# Patient Record
Sex: FEMALE | Race: BLACK OR AFRICAN AMERICAN | NOT HISPANIC OR LATINO | URBAN - METROPOLITAN AREA
[De-identification: names, ages, dates, MRNs, and addresses within clinical notes are randomized per-mention and may not be internally consistent; named-entity substitution may affect disease eponyms.]

---

## 2017-05-08 ENCOUNTER — INPATIENT (INPATIENT)
Facility: HOSPITAL | Age: 57
LOS: 2 days | Discharge: HOME | End: 2017-05-11
Attending: INTERNAL MEDICINE | Admitting: INTERNAL MEDICINE
Payer: MEDICARE

## 2017-05-08 VITALS
TEMPERATURE: 98 F | OXYGEN SATURATION: 100 % | RESPIRATION RATE: 18 BRPM | WEIGHT: 130.07 LBS | SYSTOLIC BLOOD PRESSURE: 142 MMHG | HEART RATE: 72 BPM | DIASTOLIC BLOOD PRESSURE: 77 MMHG | HEIGHT: 64 IN

## 2017-05-08 DIAGNOSIS — K21.9 GASTRO-ESOPHAGEAL REFLUX DISEASE WITHOUT ESOPHAGITIS: ICD-10-CM

## 2017-05-08 DIAGNOSIS — N18.6 END STAGE RENAL DISEASE: ICD-10-CM

## 2017-05-08 DIAGNOSIS — I20.0 UNSTABLE ANGINA: ICD-10-CM

## 2017-05-08 DIAGNOSIS — I10 ESSENTIAL (PRIMARY) HYPERTENSION: ICD-10-CM

## 2017-05-08 DIAGNOSIS — Z90.13 ACQUIRED ABSENCE OF BILATERAL BREASTS AND NIPPLES: Chronic | ICD-10-CM

## 2017-05-08 DIAGNOSIS — D25.9 LEIOMYOMA OF UTERUS, UNSPECIFIED: Chronic | ICD-10-CM

## 2017-05-08 LAB
ALBUMIN SERPL ELPH-MCNC: 2.8 G/DL — LOW (ref 3.3–5)
ALP SERPL-CCNC: 132 U/L — HIGH (ref 40–120)
ALT FLD-CCNC: 8 U/L — LOW (ref 12–78)
ANION GAP SERPL CALC-SCNC: 11 MMOL/L — SIGNIFICANT CHANGE UP (ref 5–17)
APTT BLD: 34.5 SEC — SIGNIFICANT CHANGE UP (ref 27.5–37.4)
AST SERPL-CCNC: 14 U/L — LOW (ref 15–37)
BASOPHILS # BLD AUTO: 0.1 K/UL — SIGNIFICANT CHANGE UP (ref 0–0.2)
BASOPHILS NFR BLD AUTO: 1 % — SIGNIFICANT CHANGE UP (ref 0–2)
BILIRUB SERPL-MCNC: 0.6 MG/DL — SIGNIFICANT CHANGE UP (ref 0.2–1.2)
BUN SERPL-MCNC: 39 MG/DL — HIGH (ref 7–23)
CALCIUM SERPL-MCNC: 7.8 MG/DL — LOW (ref 8.5–10.1)
CHLORIDE SERPL-SCNC: 102 MMOL/L — SIGNIFICANT CHANGE UP (ref 96–108)
CK MB BLD-MCNC: 1.6 % — SIGNIFICANT CHANGE UP (ref 0–3.5)
CK MB CFR SERPL CALC: 1.7 NG/ML — SIGNIFICANT CHANGE UP (ref 0.5–3.6)
CK SERPL-CCNC: 106 U/L — SIGNIFICANT CHANGE UP (ref 26–192)
CO2 SERPL-SCNC: 29 MMOL/L — SIGNIFICANT CHANGE UP (ref 22–31)
CREAT SERPL-MCNC: 6.86 MG/DL — HIGH (ref 0.5–1.3)
D DIMER BLD IA.RAPID-MCNC: 179 NG/ML DDU — SIGNIFICANT CHANGE UP
EOSINOPHIL # BLD AUTO: 0.2 K/UL — SIGNIFICANT CHANGE UP (ref 0–0.5)
EOSINOPHIL NFR BLD AUTO: 1.9 % — SIGNIFICANT CHANGE UP (ref 0–6)
GLUCOSE SERPL-MCNC: 120 MG/DL — HIGH (ref 70–99)
HCT VFR BLD CALC: 31.1 % — LOW (ref 34.5–45)
HGB BLD-MCNC: 11 G/DL — LOW (ref 11.5–15.5)
INR BLD: 1.11 RATIO — SIGNIFICANT CHANGE UP (ref 0.88–1.16)
LYMPHOCYTES # BLD AUTO: 1.8 K/UL — SIGNIFICANT CHANGE UP (ref 1–3.3)
LYMPHOCYTES # BLD AUTO: 21 % — SIGNIFICANT CHANGE UP (ref 13–44)
MCHC RBC-ENTMCNC: 28.1 PG — SIGNIFICANT CHANGE UP (ref 27–34)
MCHC RBC-ENTMCNC: 35.3 GM/DL — SIGNIFICANT CHANGE UP (ref 32–36)
MCV RBC AUTO: 79.6 FL — LOW (ref 80–100)
MONOCYTES # BLD AUTO: 0.5 K/UL — SIGNIFICANT CHANGE UP (ref 0–0.9)
MONOCYTES NFR BLD AUTO: 5.6 % — SIGNIFICANT CHANGE UP (ref 2–14)
NEUTROPHILS # BLD AUTO: 6.1 K/UL — SIGNIFICANT CHANGE UP (ref 1.8–7.4)
NEUTROPHILS NFR BLD AUTO: 70.5 % — SIGNIFICANT CHANGE UP (ref 43–77)
NT-PROBNP SERPL-SCNC: HIGH PG/ML (ref 0–125)
PLATELET # BLD AUTO: 245 K/UL — SIGNIFICANT CHANGE UP (ref 150–400)
POTASSIUM SERPL-MCNC: 4 MMOL/L — SIGNIFICANT CHANGE UP (ref 3.5–5.3)
POTASSIUM SERPL-SCNC: 4 MMOL/L — SIGNIFICANT CHANGE UP (ref 3.5–5.3)
PROT SERPL-MCNC: 6.7 GM/DL — SIGNIFICANT CHANGE UP (ref 6–8.3)
PROTHROM AB SERPL-ACNC: 12.1 SEC — SIGNIFICANT CHANGE UP (ref 9.8–12.7)
RBC # BLD: 3.91 M/UL — SIGNIFICANT CHANGE UP (ref 3.8–5.2)
RBC # FLD: 19.6 % — HIGH (ref 11–15)
SODIUM SERPL-SCNC: 142 MMOL/L — SIGNIFICANT CHANGE UP (ref 135–145)
TROPONIN I SERPL-MCNC: 0.04 NG/ML — SIGNIFICANT CHANGE UP (ref 0.01–0.04)
WBC # BLD: 8.7 K/UL — SIGNIFICANT CHANGE UP (ref 3.8–10.5)
WBC # FLD AUTO: 8.7 K/UL — SIGNIFICANT CHANGE UP (ref 3.8–10.5)

## 2017-05-08 PROCEDURE — 99223 1ST HOSP IP/OBS HIGH 75: CPT

## 2017-05-08 PROCEDURE — 99285 EMERGENCY DEPT VISIT HI MDM: CPT

## 2017-05-08 PROCEDURE — 71010: CPT | Mod: 26

## 2017-05-08 RX ORDER — AMLODIPINE BESYLATE 2.5 MG/1
2.5 TABLET ORAL DAILY
Qty: 0 | Refills: 0 | Status: DISCONTINUED | OUTPATIENT
Start: 2017-05-08 | End: 2017-05-11

## 2017-05-08 RX ORDER — CLOPIDOGREL BISULFATE 75 MG/1
75 TABLET, FILM COATED ORAL DAILY
Qty: 0 | Refills: 0 | Status: DISCONTINUED | OUTPATIENT
Start: 2017-05-08 | End: 2017-05-10

## 2017-05-08 RX ORDER — LABETALOL HCL 100 MG
200 TABLET ORAL EVERY 8 HOURS
Qty: 0 | Refills: 0 | Status: DISCONTINUED | OUTPATIENT
Start: 2017-05-08 | End: 2017-05-11

## 2017-05-08 RX ORDER — PANTOPRAZOLE SODIUM 20 MG/1
40 TABLET, DELAYED RELEASE ORAL
Qty: 0 | Refills: 0 | Status: DISCONTINUED | OUTPATIENT
Start: 2017-05-08 | End: 2017-05-11

## 2017-05-08 RX ORDER — NICOTINE POLACRILEX 2 MG
1 GUM BUCCAL DAILY
Qty: 0 | Refills: 0 | Status: DISCONTINUED | OUTPATIENT
Start: 2017-05-08 | End: 2017-05-11

## 2017-05-08 RX ORDER — HEPARIN SODIUM 5000 [USP'U]/ML
3500 INJECTION INTRAVENOUS; SUBCUTANEOUS EVERY 6 HOURS
Qty: 0 | Refills: 0 | Status: DISCONTINUED | OUTPATIENT
Start: 2017-05-08 | End: 2017-05-09

## 2017-05-08 RX ORDER — LISINOPRIL 2.5 MG/1
40 TABLET ORAL DAILY
Qty: 0 | Refills: 0 | Status: DISCONTINUED | OUTPATIENT
Start: 2017-05-08 | End: 2017-05-11

## 2017-05-08 RX ORDER — ASPIRIN/CALCIUM CARB/MAGNESIUM 324 MG
81 TABLET ORAL DAILY
Qty: 0 | Refills: 0 | Status: DISCONTINUED | OUTPATIENT
Start: 2017-05-09 | End: 2017-05-11

## 2017-05-08 RX ORDER — HEPARIN SODIUM 5000 [USP'U]/ML
3500 INJECTION INTRAVENOUS; SUBCUTANEOUS ONCE
Qty: 0 | Refills: 0 | Status: COMPLETED | OUTPATIENT
Start: 2017-05-08 | End: 2017-05-08

## 2017-05-08 RX ORDER — ASPIRIN/CALCIUM CARB/MAGNESIUM 324 MG
325 TABLET ORAL ONCE
Qty: 0 | Refills: 0 | Status: COMPLETED | OUTPATIENT
Start: 2017-05-08 | End: 2017-05-08

## 2017-05-08 RX ORDER — CALCIUM ACETATE 667 MG
667 TABLET ORAL
Qty: 0 | Refills: 0 | Status: DISCONTINUED | OUTPATIENT
Start: 2017-05-08 | End: 2017-05-11

## 2017-05-08 RX ORDER — HEPARIN SODIUM 5000 [USP'U]/ML
INJECTION INTRAVENOUS; SUBCUTANEOUS
Qty: 25000 | Refills: 0 | Status: DISCONTINUED | OUTPATIENT
Start: 2017-05-08 | End: 2017-05-09

## 2017-05-08 RX ADMIN — Medication 200 MILLIGRAM(S): at 23:15

## 2017-05-08 RX ADMIN — Medication 667 MILLIGRAM(S): at 23:16

## 2017-05-08 RX ADMIN — HEPARIN SODIUM 3500 UNIT(S): 5000 INJECTION INTRAVENOUS; SUBCUTANEOUS at 23:16

## 2017-05-08 RX ADMIN — HEPARIN SODIUM 700 UNIT(S)/HR: 5000 INJECTION INTRAVENOUS; SUBCUTANEOUS at 23:16

## 2017-05-08 RX ADMIN — Medication 325 MILLIGRAM(S): at 20:07

## 2017-05-08 NOTE — H&P ADULT. - MUSCULOSKELETAL
details… detailed exam normal strength/no joint swelling/ROM intact/no joint warmth/no calf tenderness/no joint erythema

## 2017-05-08 NOTE — ED ADULT NURSE NOTE - OBJECTIVE STATEMENT
pt c/o right chest pressure and weakness started yesterday after shopping, pain radiating from right shoulder to neck. + dizziness, denies sob. pt on HD m-w-f. pt did not complete dialysis today. pt states needed 1 hour and 45 to go.

## 2017-05-08 NOTE — H&P ADULT. - FAMILY HISTORY
Father  Still living? No  Family history of essential hypertension, Age at diagnosis: Age Unknown  Family history of diabetes mellitus (DM), Age at diagnosis: Age Unknown  Family history of acute myocardial infarction, Age at diagnosis: Age Unknown     Mother  Still living? No  Family history of essential hypertension, Age at diagnosis: Age Unknown  Family history of diabetes mellitus (DM), Age at diagnosis: Age Unknown

## 2017-05-08 NOTE — H&P ADULT. - PROBLEM SELECTOR PLAN 2
admit telemetry, ASA, IV heparin, beta blocker, nitrates, echo  cardiology consult  possible stress test if stable

## 2017-05-08 NOTE — ED PROVIDER NOTE - OBJECTIVE STATEMENT
55 yo female presents with hypertension presents with right sided chest pain while receiving dialysis. Patient states that chest pain last 5 minutes. Patient denies shortness of breath, fever, chills. Patient denies chest pain at present.

## 2017-05-08 NOTE — H&P ADULT. - HISTORY OF PRESENT ILLNESS
55 y/o female PMH hypertension, ESRD, on dialysis MWF presents for 2 episodes right sided chest pain; first on Sunday, pressure like, moderate intensity, radiating to right arm, associated with SOB, lasting about 30 min, subsiding with rest. Had second episode while receiving dialysis, subsided when dialysis prematurely ended, same characteristics as previous episode. Patient states that chest pain last 5 minutes. At present, patient denies shortness of breath, fever, chills. Patient denies chest pain at present. No previous episodes of chest pain.  Pt is current smoker, denies diabetes or elevated cholesterol.

## 2017-05-08 NOTE — H&P ADULT. - GASTROINTESTINAL DETAILS
nontender/no distention/soft/no masses palpable/no guarding/no rebound tenderness/bowel sounds normal

## 2017-05-08 NOTE — H&P ADULT. - NEGATIVE NEUROLOGICAL SYMPTOMS
no confusion/no facial palsy/no syncope/no paresthesias/no loss of consciousness/no tremors/no headache/no transient paralysis/no weakness/no generalized seizures/no loss of sensation

## 2017-05-08 NOTE — ED ADULT NURSE REASSESSMENT NOTE - NS ED NURSE REASSESS COMMENT FT1
Introduced myself to the pt and acclimated the patient to the unit and the proposed plan of care. Provided the pt with interventions to address comfort concerns and had the pt moved to a bed to be monitored. Pt moved from D to bed 8 with the pt consent and agreement. Provided the pt with reacclimation to the room after moving. Provided the pt with call button and television remote. Pt resting comfortably in the stretcher with belongings within pts reach. Pt has no c/o pain or discomfort at this time and has no s/s of distress. Pt states that her chest pain has resolved and the pain that started yesterday is no longer bothering her. Pt is resting comfortably in the stretcher and watching television.

## 2017-05-08 NOTE — H&P ADULT. - RS GEN PE MLT RESP DETAILS PC
breath sounds equal/no rhonchi/good air movement/clear to auscultation bilaterally/no rales/no wheezes/airway patent/respirations non-labored

## 2017-05-08 NOTE — ED ADULT TRIAGE NOTE - CHIEF COMPLAINT QUOTE
Chest pain substernal during dialysis treatment, 1.5 hours of treatment given then stopped, CP remains, ASA 81mg given by dialysis treatment

## 2017-05-08 NOTE — H&P ADULT. - ASSESSMENT
58 y/o hypertensive woman with ESRD presenting with new onset chest pain; must R/O ACS as has multiple risk factors. Pt pain free at present, received aspirin, will start IV heparin, admit telemetry,  serial cardiac enzymes.

## 2017-05-08 NOTE — ED PROVIDER NOTE - CARE PLAN
Principal Discharge DX:	Chest pain  Secondary Diagnosis:	ESRD on dialysis Principal Discharge DX:	Chest pain  Secondary Diagnosis:	ESRD on dialysis  Secondary Diagnosis:	Abnormal EKG

## 2017-05-09 LAB
ANION GAP SERPL CALC-SCNC: 13 MMOL/L — SIGNIFICANT CHANGE UP (ref 5–17)
APTT BLD: 45.3 SEC — HIGH (ref 27.5–37.4)
APTT BLD: 45.8 SEC — HIGH (ref 27.5–37.4)
APTT BLD: 48.8 SEC — HIGH (ref 27.5–37.4)
BASOPHILS # BLD AUTO: 0.1 K/UL — SIGNIFICANT CHANGE UP (ref 0–0.2)
BASOPHILS NFR BLD AUTO: 1 % — SIGNIFICANT CHANGE UP (ref 0–2)
BUN SERPL-MCNC: 50 MG/DL — HIGH (ref 7–23)
CALCIUM SERPL-MCNC: 8.2 MG/DL — LOW (ref 8.5–10.1)
CHLORIDE SERPL-SCNC: 104 MMOL/L — SIGNIFICANT CHANGE UP (ref 96–108)
CO2 SERPL-SCNC: 27 MMOL/L — SIGNIFICANT CHANGE UP (ref 22–31)
CREAT SERPL-MCNC: 8.14 MG/DL — HIGH (ref 0.5–1.3)
EOSINOPHIL # BLD AUTO: 0.3 K/UL — SIGNIFICANT CHANGE UP (ref 0–0.5)
EOSINOPHIL NFR BLD AUTO: 3.2 % — SIGNIFICANT CHANGE UP (ref 0–6)
GLUCOSE SERPL-MCNC: 101 MG/DL — HIGH (ref 70–99)
HCT VFR BLD CALC: 30.8 % — LOW (ref 34.5–45)
HGB BLD-MCNC: 10.4 G/DL — LOW (ref 11.5–15.5)
LYMPHOCYTES # BLD AUTO: 1.3 K/UL — SIGNIFICANT CHANGE UP (ref 1–3.3)
LYMPHOCYTES # BLD AUTO: 16.1 % — SIGNIFICANT CHANGE UP (ref 13–44)
MCHC RBC-ENTMCNC: 27.6 PG — SIGNIFICANT CHANGE UP (ref 27–34)
MCHC RBC-ENTMCNC: 33.9 GM/DL — SIGNIFICANT CHANGE UP (ref 32–36)
MCV RBC AUTO: 81.2 FL — SIGNIFICANT CHANGE UP (ref 80–100)
MONOCYTES # BLD AUTO: 0.6 K/UL — SIGNIFICANT CHANGE UP (ref 0–0.9)
MONOCYTES NFR BLD AUTO: 7.1 % — SIGNIFICANT CHANGE UP (ref 2–14)
NEUTROPHILS # BLD AUTO: 6 K/UL — SIGNIFICANT CHANGE UP (ref 1.8–7.4)
NEUTROPHILS NFR BLD AUTO: 72.6 % — SIGNIFICANT CHANGE UP (ref 43–77)
PLATELET # BLD AUTO: 239 K/UL — SIGNIFICANT CHANGE UP (ref 150–400)
POTASSIUM SERPL-MCNC: 4.8 MMOL/L — SIGNIFICANT CHANGE UP (ref 3.5–5.3)
POTASSIUM SERPL-SCNC: 4.8 MMOL/L — SIGNIFICANT CHANGE UP (ref 3.5–5.3)
RBC # BLD: 3.79 M/UL — LOW (ref 3.8–5.2)
RBC # FLD: 20.2 % — HIGH (ref 11–15)
SODIUM SERPL-SCNC: 144 MMOL/L — SIGNIFICANT CHANGE UP (ref 135–145)
TROPONIN I SERPL-MCNC: 0.04 NG/ML — SIGNIFICANT CHANGE UP (ref 0.01–0.04)
WBC # BLD: 8.3 K/UL — SIGNIFICANT CHANGE UP (ref 3.8–10.5)
WBC # FLD AUTO: 8.3 K/UL — SIGNIFICANT CHANGE UP (ref 3.8–10.5)

## 2017-05-09 PROCEDURE — 99222 1ST HOSP IP/OBS MODERATE 55: CPT

## 2017-05-09 PROCEDURE — 99233 SBSQ HOSP IP/OBS HIGH 50: CPT

## 2017-05-09 RX ORDER — HEPARIN SODIUM 5000 [USP'U]/ML
INJECTION INTRAVENOUS; SUBCUTANEOUS
Qty: 25000 | Refills: 0 | Status: DISCONTINUED | OUTPATIENT
Start: 2017-05-09 | End: 2017-05-09

## 2017-05-09 RX ORDER — HEPARIN SODIUM 5000 [USP'U]/ML
1000 INJECTION INTRAVENOUS; SUBCUTANEOUS
Qty: 25000 | Refills: 0 | Status: DISCONTINUED | OUTPATIENT
Start: 2017-05-09 | End: 2017-05-10

## 2017-05-09 RX ORDER — HEPARIN SODIUM 5000 [USP'U]/ML
3800 INJECTION INTRAVENOUS; SUBCUTANEOUS EVERY 6 HOURS
Qty: 0 | Refills: 0 | Status: DISCONTINUED | OUTPATIENT
Start: 2017-05-09 | End: 2017-05-10

## 2017-05-09 RX ORDER — ALLOPURINOL 300 MG
100 TABLET ORAL DAILY
Qty: 0 | Refills: 0 | Status: DISCONTINUED | OUTPATIENT
Start: 2017-05-09 | End: 2017-05-09

## 2017-05-09 RX ADMIN — HEPARIN SODIUM 1000 UNIT(S)/HR: 5000 INJECTION INTRAVENOUS; SUBCUTANEOUS at 21:23

## 2017-05-09 RX ADMIN — Medication 667 MILLIGRAM(S): at 17:30

## 2017-05-09 RX ADMIN — HEPARIN SODIUM 900 UNIT(S)/HR: 5000 INJECTION INTRAVENOUS; SUBCUTANEOUS at 13:24

## 2017-05-09 RX ADMIN — Medication 667 MILLIGRAM(S): at 11:49

## 2017-05-09 RX ADMIN — CLOPIDOGREL BISULFATE 75 MILLIGRAM(S): 75 TABLET, FILM COATED ORAL at 11:49

## 2017-05-09 RX ADMIN — Medication 200 MILLIGRAM(S): at 09:29

## 2017-05-09 RX ADMIN — Medication 1 PATCH: at 11:49

## 2017-05-09 RX ADMIN — Medication 200 MILLIGRAM(S): at 21:24

## 2017-05-09 RX ADMIN — Medication 81 MILLIGRAM(S): at 11:49

## 2017-05-09 RX ADMIN — Medication 667 MILLIGRAM(S): at 09:28

## 2017-05-09 RX ADMIN — HEPARIN SODIUM 800 UNIT(S)/HR: 5000 INJECTION INTRAVENOUS; SUBCUTANEOUS at 06:25

## 2017-05-09 RX ADMIN — PANTOPRAZOLE SODIUM 40 MILLIGRAM(S): 20 TABLET, DELAYED RELEASE ORAL at 09:28

## 2017-05-09 RX ADMIN — LISINOPRIL 40 MILLIGRAM(S): 2.5 TABLET ORAL at 09:28

## 2017-05-09 RX ADMIN — AMLODIPINE BESYLATE 2.5 MILLIGRAM(S): 2.5 TABLET ORAL at 09:29

## 2017-05-09 RX ADMIN — Medication 200 MILLIGRAM(S): at 13:24

## 2017-05-09 NOTE — CONSULT NOTE ADULT - SUBJECTIVE AND OBJECTIVE BOX
Chief Complaint:  Patient is a 56y old  Female who presents with a chief complaint of Intermittent chest pain for 2 days. (08 May 2017 21:46)      HPI:  57 y/o female PMH hypertension, ESRD, on dialysis MWF presents for 2 episodes right sided chest pain; first on , pressure like, moderate intensity, radiating to right arm, associated with SOB, lasting about 30 min, subsiding with rest. Had second episode while receiving dialysis, subsided when dialysis prematurely ended, same characteristics as previous episode. Patient states that chest pain last 5 minutes. At present, patient denies shortness of breath, fever, chills. Patient denies chest pain at present. No previous episodes of chest pain.  Pt is current smoker, denies diabetes or elevated cholesterol. (08 May 2017 21:46)    Allergies:        Allergies:  	No Known Allergies:     Home Medications:   · 	amLODIPine 10 mg oral tablet: Last Dose Taken:  , 1 tab(s) orally once a day  · 	labetalol: Last Dose Taken:  , 600 milligram(s) orally every 8 hours  · 	lisinopril 40 mg oral tablet: Last Dose Taken:  , 1 tab(s) orally once a day  · 	calcium acetate 667 mg oral capsule: Last Dose Taken:  , 4 cap(s) orally 3 times a day  · 	omeprazole 20 mg oral delayed release capsule: Last Dose Taken:  , 1 cap(s) orally once a day  · 	PhosLo: Last Dose Taken:  ,  orally       Past Medical History:  ESRD on dialysis    GERD (gastroesophageal reflux disease)    HTN (hypertension).    Past Surgical History:  Fibroid, uterine    History of bilateral breast reduction surgery.    Family History:  Father  Still living? No  Family history of essential hypertension, Age at diagnosis: Age Unknown  Family history of diabetes mellitus (DM), Age at diagnosis: Age Unknown  Family history of acute myocardial infarction, Age at diagnosis: Age Unknown     Mother  Still living? No  Family history of essential hypertension, Age at diagnosis: Age Unknown  Family history of diabetes mellitus (DM), Age at diagnosis: Age Unknown.    Social History:  · Marital Status	Single	  · Lives With	other relative	  · Notes	sister	    Substance Use History:  · Substance Use	never used 	    Alcohol Use History:  · Have you ever consumed alcohol	never	    Tobacco Usage:  · Tobacco Usage: Current every day smoker	  · Tobacco Type: cigarettes 	  · Number of Packs per Day: 0.06 	  · Number of yrs: 15 	  · Pack yrs: 1 	  · Readiness to Quit Tobacco Use: thinking about quitting 	  · Attempts to Quit Tobacco Use: quit on own 	  · Tobacco Withdrawal Patterns: depression  headache 	  · Cessation Medication Offered: accepted	  · Cessation Medication Accepted: order requested	    Review of Systems:  General:  No wt loss, fevers, chills, night sweats  Eyes:  Good vision, no reported pain  ENT:  No sore throat, pain, runny nose, dysphagia  CV:  No pain, palpitations hypo/hypertension  Resp:  No dyspnea, cough, tachypnea, wheezing  GI:  No pain, nausea, vomiting, diarrhea, constipation  :  No pain, bleeding, incontinence, nocturia  Muscle:  No pain, weakness  Breast:  No pain, abscess, mass, discharge  Neuro:  No weakness, tingling, memory problems  Psych:  No fatigue, insomnia, mood problems, depression  Endocrine:  No polyuria, polydypsia, cold/heat intolerance  Heme:  No petechiae, ecchymosis, easy bruisability  Skin:  No rash, edema      Physical Exam:  Vital Signs:  Vital Signs Last 24 Hrs  T(C): 37.1, Max: 37.1 ( @ 12:13)  T(F): 98.8, Max: 98.8 ( @ 12:13)  HR: 69 (63 - 72)  BP: 172/81 (142/77 - 172/81)  RR: 18 (16 - 19)  SpO2: 100% (96% - 100%)  Daily Height in cm: 162.56 (09 May 2017 10:11)    Daily Weight in k (09 May 2017 10:11)  I&O's Summary    I & Os for current day (as of 09 May 2017 16:42)  =============================================  IN: 300 ml / OUT: 0 ml / NET: 300 ml      General:  Appears stated age, well-groomed, well-nourished, no distress  HEENT:  NC/AT, patent nares w/ pink mucosa, OP clear w/o lesions, PERRL, EOMI, conjunctivae clear, no thyromegaly, nodules, adenopathy, no JVD  Chest:  Full & symmetric excursion, no increased effort, breath sounds clear  Cardiovascular:  Regular rhythm, S1, S2, no murmur/rub/S3/S4, no carotid/femoral/abdominal bruit, radial/pedal pulses 2+  Abdomen:  Soft, non-tender, non-distended, normoactive bowel sounds  Extremities: , no edema  Skin:  No rash/erythema/ecchymoses. Skin is warm/dry  Musculoskeletal:  Full ROM in all joints w/o swelling/tenderness/effusion  Neuro/Psych:  Alert, oriented,    Laboratory:                            10.4   8.3   )-----------( 239      ( 09 May 2017 05:48 )             30.8         144  |  104  |  50<H>  ----------------------------<  101<H>  4.8   |  27  |  8.14<H>    Ca    8.2<L>      09 May 2017 05:48    TPro  6.7  /  Alb  2.8<L>  /  TBili  0.6  /  DBili  x   /  AST  14<L>  /  ALT  8<L>  /  AlkPhos  132<H>        CARDIAC MARKERS ( 09 May 2017 15:15 )  .036 ng/mL / x     / x     / x     / x      CARDIAC MARKERS ( 09 May 2017 05:48 )  .035 ng/mL / x     / x     / x     / x      CARDIAC MARKERS ( 08 May 2017 23:42 )  .040 ng/mL / x     / x     / x     / x      CARDIAC MARKERS ( 08 May 2017 18:33 )  .041 ng/mL / x     / 106 U/L / x     / 1.7 ng/mL      LIVER FUNCTIONS - ( 08 May 2017 18:33 )  Alb: 2.8 g/dL / Pro: 6.7 gm/dL / ALK PHOS: 132 U/L / ALT: 8 U/L / AST: 14 U/L / GGT: x           PT/INR - ( 08 May 2017 18:33 )   PT: 12.1 sec;   INR: 1.11 ratio         PTT - ( 09 May 2017 12:52 )  PTT:45.8 sec      Imaging:  ecg:    EXAM:  CHEST SINGLE VIEW                            PROCEDURE DATE:  2017        INTERPRETATION:  CLINICAL INFORMATION:Chest pain    TECHNIQUE: AP chest film. Comparison is made to 2016    FINDINGS AND   IMPRESSION:  Both costophrenic angle is excluded from the study and there   is mild elevation of the right hemidiaphragm.  No focal consolidation or   pleural effusion identified. Heart size appears enlarged, likely   exaggerated by projection. The osseous structures are intact.    Assessment:57 y/o female PMH hypertension, ESRD, on dialysis MWF presents for 2 episodes right sided chest pain; first on , pressure like, moderate intensity, radiating to right arm, associated with SOB, lasting about 30 min, subsiding with rest. Had second episode while receiving dialysis, subsided when dialysis prematurely ended, same characteristics as previous episode. Patient states that chest pain last 5 minutes. At present, patient denies shortness of breath, fever, chills. Patient denies chest pain at present. No previous episodes of chest pain.  Pt is current smoker, denies diabetes or elevated cholesterol.     Plan:  Tele monitor.  Check  echo  Con't with:  lisinopril 40milliGRAM(s) Oral daily  labetalol 200milliGRAM(s) Oral every 8 hours  amLODIPine   Tablet 2.5milliGRAM(s) Oral daily  calcium acetate 667milliGRAM(s) Oral four times a day with meals  pantoprazole    Tablet 40milliGRAM(s) Oral before breakfast  nicotine -   7 mG/24Hr(s) Patch 1patch Transdermal daily  heparin  Infusion. Unit(s)/Hr IV Continuous <Continuous>  clopidogrel Tablet 75milliGRAM(s) Oral daily  aspirin enteric coated 81milliGRAM(s) Oral daily     smoking cessation and supportive care.    Nimesh Manley MD, FACC, LENNOX, MARINA, FACP  Director, Heart Failure Services  [facility]  , Department of Cardiology  [facility] Chief Complaint:  Patient is a 56y old  Female who presents with a chief complaint of Intermittent chest pain for 2 days. (08 May 2017 21:46)      HPI:  55 y/o female PMH hypertension, ESRD, on dialysis MWF presents for 2 episodes right sided chest pain; first on , pressure like, moderate intensity, radiating to right arm, associated with SOB, lasting about 30 min, subsiding with rest. Had second episode while receiving dialysis, subsided when dialysis prematurely ended, same characteristics as previous episode. Patient states that chest pain last 5 minutes. At present, patient denies shortness of breath, fever, chills. Patient denies chest pain at present. No previous episodes of chest pain.  Pt is current smoker, denies diabetes or elevated cholesterol. (08 May 2017 21:46)    Allergies:        Allergies:  	No Known Allergies:     Home Medications:   · 	amLODIPine 10 mg oral tablet: Last Dose Taken:  , 1 tab(s) orally once a day  · 	labetalol: Last Dose Taken:  , 600 milligram(s) orally every 8 hours  · 	lisinopril 40 mg oral tablet: Last Dose Taken:  , 1 tab(s) orally once a day  · 	calcium acetate 667 mg oral capsule: Last Dose Taken:  , 4 cap(s) orally 3 times a day  · 	omeprazole 20 mg oral delayed release capsule: Last Dose Taken:  , 1 cap(s) orally once a day  · 	PhosLo: Last Dose Taken:  ,  orally       Past Medical History:  ESRD on dialysis    GERD (gastroesophageal reflux disease)    HTN (hypertension).    Past Surgical History:  Fibroid, uterine    History of bilateral breast reduction surgery.    Family History:  Father  Still living? No  Family history of essential hypertension, Age at diagnosis: Age Unknown  Family history of diabetes mellitus (DM), Age at diagnosis: Age Unknown  Family history of acute myocardial infarction, Age at diagnosis: Age Unknown     Mother  Still living? No  Family history of essential hypertension, Age at diagnosis: Age Unknown  Family history of diabetes mellitus (DM), Age at diagnosis: Age Unknown.    Social History:  · Marital Status	Single	  · Lives With	other relative	  · Notes	sister	    Substance Use History:  · Substance Use	never used 	    Alcohol Use History:  · Have you ever consumed alcohol	never	    Tobacco Usage:  · Tobacco Usage: Current every day smoker	  · Tobacco Type: cigarettes 	  · Number of Packs per Day: 0.06 	  · Number of yrs: 15 	  · Pack yrs: 1 	  · Readiness to Quit Tobacco Use: thinking about quitting 	  · Attempts to Quit Tobacco Use: quit on own 	  · Tobacco Withdrawal Patterns: depression  headache 	  · Cessation Medication Offered: accepted	  · Cessation Medication Accepted: order requested	    Review of Systems:  General:  No wt loss, fevers, chills, night sweats  Eyes:  Good vision, no reported pain  ENT:  No sore throat, pain, runny nose, dysphagia  CV:  No pain, palpitations hypo/hypertension  Resp:  No dyspnea, cough, tachypnea, wheezing  GI:  No pain, nausea, vomiting, diarrhea, constipation  :  No pain, bleeding, incontinence, nocturia  Muscle:  No pain, weakness  Breast:  No pain, abscess, mass, discharge  Neuro:  No weakness, tingling, memory problems  Psych:  No fatigue, insomnia, mood problems, depression  Endocrine:  No polyuria, polydypsia, cold/heat intolerance  Heme:  No petechiae, ecchymosis, easy bruisability  Skin:  No rash, edema      Physical Exam:  Vital Signs:  Vital Signs Last 24 Hrs  T(C): 37.1, Max: 37.1 ( @ 12:13)  T(F): 98.8, Max: 98.8 ( @ 12:13)  HR: 69 (63 - 72)  BP: 172/81 (142/77 - 172/81)  RR: 18 (16 - 19)  SpO2: 100% (96% - 100%)  Daily Height in cm: 162.56 (09 May 2017 10:11)    Daily Weight in k (09 May 2017 10:11)  I&O's Summary    I & Os for current day (as of 09 May 2017 16:42)  =============================================  IN: 300 ml / OUT: 0 ml / NET: 300 ml    Tele: SR  General:  Appears stated age, well-groomed, well-nourished, no distress  HEENT:  NC/AT, patent nares w/ pink mucosa, OP clear w/o lesions, PERRL, EOMI, conjunctivae clear, no thyromegaly, nodules, adenopathy, no JVD  Chest:  Full & symmetric excursion, no increased effort, breath sounds clear  Cardiovascular:  Regular rhythm, S1, S2, no murmur/rub/S3/S4, no carotid/femoral/abdominal bruit, radial/pedal pulses 2+  Abdomen:  Soft, non-tender, non-distended, normoactive bowel sounds  Extremities: , no edema  Skin:  No rash/erythema/ecchymoses. Skin is warm/dry  Musculoskeletal:  Full ROM in all joints w/o swelling/tenderness/effusion  Neuro/Psych:  Alert, oriented,    Laboratory:                            10.4   8.3   )-----------( 239      ( 09 May 2017 05:48 )             30.8         144  |  104  |  50<H>  ----------------------------<  101<H>  4.8   |  27  |  8.14<H>    Ca    8.2<L>      09 May 2017 05:48    TPro  6.7  /  Alb  2.8<L>  /  TBili  0.6  /  DBili  x   /  AST  14<L>  /  ALT  8<L>  /  AlkPhos  132<H>  08      CARDIAC MARKERS ( 09 May 2017 15:15 )  .036 ng/mL / x     / x     / x     / x      CARDIAC MARKERS ( 09 May 2017 05:48 )  .035 ng/mL / x     / x     / x     / x      CARDIAC MARKERS ( 08 May 2017 23:42 )  .040 ng/mL / x     / x     / x     / x      CARDIAC MARKERS ( 08 May 2017 18:33 )  .041 ng/mL / x     / 106 U/L / x     / 1.7 ng/mL      LIVER FUNCTIONS - ( 08 May 2017 18:33 )  Alb: 2.8 g/dL / Pro: 6.7 gm/dL / ALK PHOS: 132 U/L / ALT: 8 U/L / AST: 14 U/L / GGT: x           PT/INR - ( 08 May 2017 18:33 )   PT: 12.1 sec;   INR: 1.11 ratio         PTT - ( 09 May 2017 12:52 )  PTT:45.8 sec      Imaging:  ecg: SR, LAE, LVH    EXAM:  CHEST SINGLE VIEW                            PROCEDURE DATE:  2017        INTERPRETATION:  CLINICAL INFORMATION:Chest pain    TECHNIQUE: AP chest film. Comparison is made to 2016    FINDINGS AND   IMPRESSION:  Both costophrenic angle is excluded from the study and there   is mild elevation of the right hemidiaphragm.  No focal consolidation or   pleural effusion identified. Heart size appears enlarged, likely   exaggerated by projection. The osseous structures are intact.    Assessment:55 y/o female PMH hypertension, ESRD, on dialysis MWF presents for 2 episodes right sided chest pain; first on , pressure like, moderate intensity, radiating to right arm, associated with SOB, lasting about 30 min, subsiding with rest. Had second episode while receiving dialysis, subsided when dialysis prematurely ended, same characteristics as previous episode. Patient states that chest pain last 5 minutes. At present, patient denies shortness of breath, fever, chills. Patient denies chest pain at present. No previous episodes of chest pain.  Pt is current smoker, denies diabetes or elevated cholesterol. Resting. NAD.    Plan:  Tele monitor.  Check  echo  Con't with:  lisinopril 40milliGRAM(s) Oral daily  labetalol 200milliGRAM(s) Oral every 8 hours  amLODIPine   Tablet 2.5milliGRAM(s) Oral daily  calcium acetate 667milliGRAM(s) Oral four times a day with meals  pantoprazole    Tablet 40milliGRAM(s) Oral before breakfast  nicotine -   7 mG/24Hr(s) Patch 1patch Transdermal daily  heparin  Infusion. Unit(s)/Hr IV Continuous <Continuous>  clopidogrel Tablet 75milliGRAM(s) Oral daily  aspirin enteric coated 81milliGRAM(s) Oral daily     smoking cessation and supportive care.    Nimesh Manley MD, FACC, FASE, FASNC, FACP  Director, Heart Failure Services  [facility]  , Department of Cardiology  [facility]

## 2017-05-09 NOTE — CONSULT NOTE ADULT - SUBJECTIVE AND OBJECTIVE BOX
Patient is a 56y old  Female who presents with a chief complaint of Intermittent chest pain for 2 days. (08 May 2017 21:46)    HPI:  55 y/o female PMH hypertension, ESRD, on dialysis MWF presents for 2 episodes right sided chest pain; first on Sunday, pressure like, moderate intensity, radiating to right arm, associated with SOB, lasting about 30 min, subsiding with rest. Had second episode while receiving dialysis, subsided when dialysis prematurely ended, same characteristics as previous episode. Patient states that chest pain last 5 minutes. At present, patient denies shortness of breath, fever, chills. Patient denies chest pain at present. No previous episodes of chest pain.  Pt is current smoker, denies diabetes or elevated cholesterol. (08 May 2017 21:46)    Patient HD terminated in the last 1 hour yesterday.  NO sob or chest pain now.    PAST MEDICAL & SURGICAL HISTORY:  GERD (gastroesophageal reflux disease)  ESRD on dialysis  HTN (hypertension)  Fibroid, uterine  History of bilateral breast reduction surgery    FAMILY HISTORY:  Family history of acute myocardial infarction (Father)  Family history of diabetes mellitus (DM) (Father, Mother)  Family history of essential hypertension (Father, Mother)  No pertinent family history in first degree relatives    No Known Allergies    MEDICATIONS  (STANDING):  lisinopril 40milliGRAM(s) Oral daily  labetalol 200milliGRAM(s) Oral every 8 hours  amLODIPine   Tablet 2.5milliGRAM(s) Oral daily  calcium acetate 667milliGRAM(s) Oral four times a day with meals  pantoprazole    Tablet 40milliGRAM(s) Oral before breakfast  nicotine -   7 mG/24Hr(s) Patch 1patch Transdermal daily  heparin  Infusion. Unit(s)/Hr IV Continuous <Continuous>  clopidogrel Tablet 75milliGRAM(s) Oral daily  aspirin enteric coated 81milliGRAM(s) Oral daily    MEDICATIONS  (PRN):  heparin  Injectable 3500Unit(s) IV Push every 6 hours PRN For aPTT less than 40    Vital Signs Last 24 Hrs  T(C): 37.1, Max: 37.1 (05-09 @ 12:13)  T(F): 98.8, Max: 98.8 (05-09 @ 12:13)  HR: 69 (63 - 72)  BP: 172/81 (142/77 - 172/81)  BP(mean): --  RR: 18 (16 - 19)  SpO2: 100% (96% - 100%)    CAPILLARY BLOOD GLUCOSE    PHYSICAL EXAM:      T(C): 37.1, Max: 37.1 (05-09 @ 12:13)  HR: 69 (63 - 72)  BP: 172/81 (142/77 - 172/81)  RR: 18 (16 - 19)  SpO2: 100% (96% - 100%)  Wt(kg): --  Respiratory: clear anteriorly, decreased BS at bases  Cardiovascular: S1 S2  Gastrointestinal: soft NT ND +BS  Extremities:    tr edema  + AVF              05-09    144  |  104  |  50<H>  ----------------------------<  101<H>  4.8   |  27  |  8.14<H>    Ca    8.2<L>      09 May 2017 05:48    TPro  6.7  /  Alb  2.8<L>  /  TBili  0.6  /  DBili  x   /  AST  14<L>  /  ALT  8<L>  /  AlkPhos  132<H>  05-08                          10.4   8.3   )-----------( 239      ( 09 May 2017 05:48 )             30.8         Assessment and Plan.    ESRD, chest pain , r/o ACS;   HD for Wednesday;   Will follow.

## 2017-05-10 DIAGNOSIS — N18.6 END STAGE RENAL DISEASE: ICD-10-CM

## 2017-05-10 LAB
ANION GAP SERPL CALC-SCNC: 14 MMOL/L — SIGNIFICANT CHANGE UP (ref 5–17)
APTT BLD: 53.1 SEC — HIGH (ref 27.5–37.4)
APTT BLD: 54.2 SEC — HIGH (ref 27.5–37.4)
BUN SERPL-MCNC: 61 MG/DL — HIGH (ref 7–23)
CALCIUM SERPL-MCNC: 8.2 MG/DL — LOW (ref 8.5–10.1)
CHLORIDE SERPL-SCNC: 101 MMOL/L — SIGNIFICANT CHANGE UP (ref 96–108)
CO2 SERPL-SCNC: 26 MMOL/L — SIGNIFICANT CHANGE UP (ref 22–31)
CREAT SERPL-MCNC: 9.35 MG/DL — HIGH (ref 0.5–1.3)
GLUCOSE SERPL-MCNC: 96 MG/DL — SIGNIFICANT CHANGE UP (ref 70–99)
HCT VFR BLD CALC: 29.7 % — LOW (ref 34.5–45)
HGB BLD-MCNC: 10.5 G/DL — LOW (ref 11.5–15.5)
MCHC RBC-ENTMCNC: 28.7 PG — SIGNIFICANT CHANGE UP (ref 27–34)
MCHC RBC-ENTMCNC: 35.4 GM/DL — SIGNIFICANT CHANGE UP (ref 32–36)
MCV RBC AUTO: 81 FL — SIGNIFICANT CHANGE UP (ref 80–100)
PLATELET # BLD AUTO: 218 K/UL — SIGNIFICANT CHANGE UP (ref 150–400)
POTASSIUM SERPL-MCNC: 4.5 MMOL/L — SIGNIFICANT CHANGE UP (ref 3.5–5.3)
POTASSIUM SERPL-SCNC: 4.5 MMOL/L — SIGNIFICANT CHANGE UP (ref 3.5–5.3)
RBC # BLD: 3.66 M/UL — LOW (ref 3.8–5.2)
RBC # FLD: 19.9 % — HIGH (ref 11–15)
SODIUM SERPL-SCNC: 141 MMOL/L — SIGNIFICANT CHANGE UP (ref 135–145)
WBC # BLD: 8.5 K/UL — SIGNIFICANT CHANGE UP (ref 3.8–10.5)
WBC # FLD AUTO: 8.5 K/UL — SIGNIFICANT CHANGE UP (ref 3.8–10.5)

## 2017-05-10 PROCEDURE — 99232 SBSQ HOSP IP/OBS MODERATE 35: CPT

## 2017-05-10 PROCEDURE — 99233 SBSQ HOSP IP/OBS HIGH 50: CPT

## 2017-05-10 PROCEDURE — 78452 HT MUSCLE IMAGE SPECT MULT: CPT | Mod: 26

## 2017-05-10 RX ORDER — OXYCODONE HYDROCHLORIDE 5 MG/1
5 TABLET ORAL ONCE
Qty: 0 | Refills: 0 | Status: DISCONTINUED | OUTPATIENT
Start: 2017-05-10 | End: 2017-05-10

## 2017-05-10 RX ORDER — ACETAMINOPHEN 500 MG
650 TABLET ORAL ONCE
Qty: 0 | Refills: 0 | Status: COMPLETED | OUTPATIENT
Start: 2017-05-10 | End: 2017-05-10

## 2017-05-10 RX ORDER — REGADENOSON 0.08 MG/ML
0.4 INJECTION, SOLUTION INTRAVENOUS ONCE
Qty: 0 | Refills: 0 | Status: COMPLETED | OUTPATIENT
Start: 2017-05-10 | End: 2017-05-10

## 2017-05-10 RX ADMIN — Medication 667 MILLIGRAM(S): at 14:29

## 2017-05-10 RX ADMIN — Medication 200 MILLIGRAM(S): at 14:30

## 2017-05-10 RX ADMIN — Medication 667 MILLIGRAM(S): at 16:39

## 2017-05-10 RX ADMIN — Medication 81 MILLIGRAM(S): at 14:28

## 2017-05-10 RX ADMIN — PANTOPRAZOLE SODIUM 40 MILLIGRAM(S): 20 TABLET, DELAYED RELEASE ORAL at 06:21

## 2017-05-10 RX ADMIN — HEPARIN SODIUM 1000 UNIT(S)/HR: 5000 INJECTION INTRAVENOUS; SUBCUTANEOUS at 05:23

## 2017-05-10 RX ADMIN — REGADENOSON 0.4 MILLIGRAM(S): 0.08 INJECTION, SOLUTION INTRAVENOUS at 11:26

## 2017-05-10 RX ADMIN — Medication 1 PATCH: at 14:29

## 2017-05-10 RX ADMIN — Medication 200 MILLIGRAM(S): at 05:21

## 2017-05-10 RX ADMIN — LISINOPRIL 40 MILLIGRAM(S): 2.5 TABLET ORAL at 05:23

## 2017-05-10 RX ADMIN — Medication 200 MILLIGRAM(S): at 21:30

## 2017-05-10 RX ADMIN — CLOPIDOGREL BISULFATE 75 MILLIGRAM(S): 75 TABLET, FILM COATED ORAL at 14:29

## 2017-05-10 RX ADMIN — Medication 667 MILLIGRAM(S): at 08:36

## 2017-05-10 RX ADMIN — OXYCODONE HYDROCHLORIDE 5 MILLIGRAM(S): 5 TABLET ORAL at 03:40

## 2017-05-10 RX ADMIN — Medication 1 PATCH: at 14:19

## 2017-05-10 RX ADMIN — OXYCODONE HYDROCHLORIDE 5 MILLIGRAM(S): 5 TABLET ORAL at 04:39

## 2017-05-10 RX ADMIN — HEPARIN SODIUM 1000 UNIT(S)/HR: 5000 INJECTION INTRAVENOUS; SUBCUTANEOUS at 14:32

## 2017-05-10 RX ADMIN — OXYCODONE HYDROCHLORIDE 5 MILLIGRAM(S): 5 TABLET ORAL at 23:48

## 2017-05-10 RX ADMIN — AMLODIPINE BESYLATE 2.5 MILLIGRAM(S): 2.5 TABLET ORAL at 05:21

## 2017-05-10 RX ADMIN — Medication 650 MILLIGRAM(S): at 23:48

## 2017-05-10 NOTE — PROGRESS NOTE ADULT - PROBLEM SELECTOR PLAN 1
HD as per renal, M/W/F
dialysis not completed,  renal consult appreciated   no SOB at present, no hyperkalemia
ASA, IV heparin, beta blocker, nitrates,   troponin not indicative of ACS.  However with elevated risk for CAD will plan stress test today.

## 2017-05-10 NOTE — PROGRESS NOTE ADULT - PROBLEM SELECTOR PROBLEM 4
Gastroesophageal reflux disease, esophagitis presence not specified
Gastroesophageal reflux disease, esophagitis presence not specified
ESRD on dialysis

## 2017-05-10 NOTE — PROGRESS NOTE ADULT - PROBLEM SELECTOR PROBLEM 3
Essential hypertension
Essential hypertension
Gastroesophageal reflux disease, esophagitis presence not specified

## 2017-05-10 NOTE — PROGRESS NOTE ADULT - ASSESSMENT
55 y/o female PMH hypertension, ESRD, on dialysis MWF presents for 2 episodes right sided chest pain; first on Sunday, pressure like, moderate intensity, radiating to right arm, associated with SOB, lasting about 30 min, subsiding with rest. Had second episode while receiving dialysis, subsided when dialysis prematurely ended, same characteristics as previous episode. Patient states that chest pain last 5 minutes. At present, patient denies shortness of breath, fever, chills. Patient denies chest pain at present. No previous episodes of chest pain.  Pt is current smoker, denies diabetes or elevated cholesterol. On Heparin drip r/t possibility of cardiac origin. Stress test ordered.
58 y/o hypertensive woman with ESRD presenting with new onset chest pain; must R/O ACS as has multiple risk factors. Pt pain free at present, received aspirin, will start IV heparin, admit telemetry,  cardiac enzymes neg., stress test today
58 y/o hypertensive woman with ESRD presenting with new onset chest pain; must R/O ACS as has multiple risk factors. Pt pain free at present, received aspirin, will start IV heparin, admit telemetry,  serial cardiac enzymes.

## 2017-05-10 NOTE — PROGRESS NOTE ADULT - PROBLEM SELECTOR PLAN 2
admit telemetry, ASA, IV heparin, beta blocker, nitrates, echo  cardiology consult appreciated   possible stress test if stable
admit telemetry, ASA, beta blocker, nitrates, echo  stress test negative  heparin discontinued
continue BP medication.

## 2017-05-11 VITALS
DIASTOLIC BLOOD PRESSURE: 82 MMHG | TEMPERATURE: 99 F | SYSTOLIC BLOOD PRESSURE: 169 MMHG | RESPIRATION RATE: 18 BRPM | OXYGEN SATURATION: 100 % | HEART RATE: 66 BPM

## 2017-05-11 LAB
ANION GAP SERPL CALC-SCNC: 11 MMOL/L — SIGNIFICANT CHANGE UP (ref 5–17)
APTT BLD: 33.5 SEC — SIGNIFICANT CHANGE UP (ref 27.5–37.4)
BUN SERPL-MCNC: 31 MG/DL — HIGH (ref 7–23)
CALCIUM SERPL-MCNC: 8.4 MG/DL — LOW (ref 8.5–10.1)
CHLORIDE SERPL-SCNC: 101 MMOL/L — SIGNIFICANT CHANGE UP (ref 96–108)
CO2 SERPL-SCNC: 30 MMOL/L — SIGNIFICANT CHANGE UP (ref 22–31)
CREAT SERPL-MCNC: 6.54 MG/DL — HIGH (ref 0.5–1.3)
GLUCOSE SERPL-MCNC: 121 MG/DL — HIGH (ref 70–99)
HCT VFR BLD CALC: 31 % — LOW (ref 34.5–45)
HGB BLD-MCNC: 10.3 G/DL — LOW (ref 11.5–15.5)
MCHC RBC-ENTMCNC: 27.2 PG — SIGNIFICANT CHANGE UP (ref 27–34)
MCHC RBC-ENTMCNC: 33.2 GM/DL — SIGNIFICANT CHANGE UP (ref 32–36)
MCV RBC AUTO: 82 FL — SIGNIFICANT CHANGE UP (ref 80–100)
PLATELET # BLD AUTO: 220 K/UL — SIGNIFICANT CHANGE UP (ref 150–400)
POTASSIUM SERPL-MCNC: 4.3 MMOL/L — SIGNIFICANT CHANGE UP (ref 3.5–5.3)
POTASSIUM SERPL-SCNC: 4.3 MMOL/L — SIGNIFICANT CHANGE UP (ref 3.5–5.3)
RBC # BLD: 3.78 M/UL — LOW (ref 3.8–5.2)
RBC # FLD: 20 % — HIGH (ref 11–15)
SODIUM SERPL-SCNC: 142 MMOL/L — SIGNIFICANT CHANGE UP (ref 135–145)
WBC # BLD: 6.7 K/UL — SIGNIFICANT CHANGE UP (ref 3.8–10.5)
WBC # FLD AUTO: 6.7 K/UL — SIGNIFICANT CHANGE UP (ref 3.8–10.5)

## 2017-05-11 PROCEDURE — 99239 HOSP IP/OBS DSCHRG MGMT >30: CPT

## 2017-05-11 PROCEDURE — 93306 TTE W/DOPPLER COMPLETE: CPT | Mod: 26

## 2017-05-11 RX ORDER — AMLODIPINE BESYLATE 2.5 MG/1
5 TABLET ORAL ONCE
Qty: 0 | Refills: 0 | Status: COMPLETED | OUTPATIENT
Start: 2017-05-11 | End: 2017-05-11

## 2017-05-11 RX ORDER — NICOTINE POLACRILEX 2 MG
1 GUM BUCCAL
Qty: 1 | Refills: 0
Start: 2017-05-11 | End: 2017-06-10

## 2017-05-11 RX ADMIN — OXYCODONE HYDROCHLORIDE 5 MILLIGRAM(S): 5 TABLET ORAL at 00:29

## 2017-05-11 RX ADMIN — Medication 650 MILLIGRAM(S): at 00:29

## 2017-05-11 RX ADMIN — AMLODIPINE BESYLATE 5 MILLIGRAM(S): 2.5 TABLET ORAL at 15:32

## 2017-05-11 RX ADMIN — Medication 667 MILLIGRAM(S): at 11:52

## 2017-05-11 RX ADMIN — LISINOPRIL 40 MILLIGRAM(S): 2.5 TABLET ORAL at 05:13

## 2017-05-11 RX ADMIN — AMLODIPINE BESYLATE 2.5 MILLIGRAM(S): 2.5 TABLET ORAL at 05:12

## 2017-05-11 RX ADMIN — Medication 1 PATCH: at 11:52

## 2017-05-11 RX ADMIN — Medication 200 MILLIGRAM(S): at 14:38

## 2017-05-11 RX ADMIN — Medication 1 PATCH: at 14:37

## 2017-05-11 RX ADMIN — Medication 81 MILLIGRAM(S): at 11:52

## 2017-05-11 RX ADMIN — PANTOPRAZOLE SODIUM 40 MILLIGRAM(S): 20 TABLET, DELAYED RELEASE ORAL at 05:13

## 2017-05-11 RX ADMIN — Medication 200 MILLIGRAM(S): at 05:12

## 2017-05-11 NOTE — PROGRESS NOTE ADULT - SUBJECTIVE AND OBJECTIVE BOX
Patient is a 56y old  Female who presents with a chief complaint of Intermittent chest pain for 2 days. (08 May 2017 21:46)    HPI :57 y/o female PMH hypertension, ESRD, on dialysis MWF presents for 2 episodes right sided chest pain; first on Sunday, pressure like, moderate intensity, radiating to right arm, associated with SOB, lasting about 30 min, subsiding with rest. Had second episode while receiving dialysis, subsided when dialysis prematurely ended, same characteristics as previous episode. Patient states that chest pain last 5 minutes. At present, patient denies shortness of breath, fever, chills. No previous episodes of chest pain.  Pt is current smoker, denies diabetes or elevated cholesterol    PAST MEDICAL & SURGICAL HISTORY:  GERD (gastroesophageal reflux disease)  ESRD on dialysis  HTN (hypertension)  Fibroid, uterine  History of bilateral breast reduction surgery      INTERVAL HISTORY: No further CP overnight. Heparin drip is therapeutic.   	  MEDICATIONS:  MEDICATIONS  (STANDING):  lisinopril 40milliGRAM(s) Oral daily  labetalol 200milliGRAM(s) Oral every 8 hours  amLODIPine   Tablet 2.5milliGRAM(s) Oral daily  calcium acetate 667milliGRAM(s) Oral four times a day with meals  pantoprazole    Tablet 40milliGRAM(s) Oral before breakfast  nicotine -   7 mG/24Hr(s) Patch 1patch Transdermal daily  clopidogrel Tablet 75milliGRAM(s) Oral daily  aspirin enteric coated 81milliGRAM(s) Oral daily  heparin  Infusion. 1000Unit(s)/Hr IV Continuous <Continuous>  regadenoson Injectable 0.4milliGRAM(s) IV Push once    MEDICATIONS  (PRN):  heparin  Injectable 3800Unit(s) IV Push every 6 hours PRN For aPTT less than 40      Vitals:  T(F): 97.6, Max: 98.8 (05-09 @ 12:13)  HR: 70 (63 - 80)  BP: 172/84 (166/75 - 172/84)  RR: 18 (18 - 18)  SpO2: 97% (97% - 100%)  Wt(kg): --    I & Os for current day (as of 05-10 @ 10:01)  =============================================  IN:    Oral Fluid: 660 ml    Total IN: 660 ml  ---------------------------------------------  OUT:    Total OUT: 0 ml  ---------------------------------------------  Total NET: 660 ml    Height (cm): 162.6 (05-09 @ 10:11)  Weight (kg): 64 (05-09 @ 10:11)  BMI (kg/m2): 24.2 (05-09 @ 10:11)  BSA (m2): 1.69 (05-09 @ 10:11)    PHYSICAL EXAM:  Neuro: Awake, responsive  CV: S1 S2 RRR, NSR on Tele  Lungs: CTABL anteriorly, dim bibas  GI: Soft, BS +, ND, NT  Extremities: tr edema, (+) AVF    TELEMETRY: NSR    ecg: SR, LAE, LVH    EXAM:  CHEST SINGLE VIEW                            PROCEDURE DATE:  05/08/2017        INTERPRETATION:  CLINICAL INFORMATION:Chest pain    TECHNIQUE: AP chest film. Comparison is made to 12/26/2016    FINDINGS AND   IMPRESSION:  Both costophrenic angle is excluded from the study and there   is mild elevation of the right hemidiaphragm.  No focal consolidation or   pleural effusion identified. Heart size appears enlarged, likely   exaggerated by projection. The osseous structures are intact.  	     DIAGNOSTIC TESTING:    [ ] Echocardiogram:    [ ] Stress Test:  to be done today   OTHER: 	    LABS:	 	    CARDIAC MARKERS:  Troponin I, Serum: .036 ng/mL (05-09 @ 15:15)  Troponin I, Serum: .035 ng/mL (05-09 @ 05:48)  Troponin I, Serum: .040 ng/mL (05-08 @ 23:42)  Troponin I, Serum: .041 ng/mL (05-08 @ 18:33)                            10.5   8.5   )-----------( 218      ( 10 May 2017 04:35 )             29.7 ,                       10.4   8.3   )-----------( 239      ( 09 May 2017 05:48 )             30.8 ,                       11.0   8.7   )-----------( 245      ( 08 May 2017 18:33 )             31.1   05-09    144  |  104  |  50<H>  ----------------------------<  101<H>  4.8   |  27  |  8.14<H>    Ca    8.2<L>      09 May 2017 05:48    TPro  6.7  /  Alb  2.8<L>  /  TBili  0.6  /  DBili  x   /  AST  14<L>  /  ALT  8<L>  /  AlkPhos  132<H>  05-08    proBNP: Serum Pro-Brain Natriuretic Peptide: 09421 pg/mL (05-08 @ 19:40)    :
CHIEF COMPLAINT/INTERVAL HISTORY:    Patient is a 56y old  Female who presents with a chief complaint of Intermittent chest pain for 2 days. (08 May 2017 21:46)      HPI:  55 y/o female PMH hypertension, ESRD, on dialysis MWF presents for 2 episodes right sided chest pain; first on Sunday, pressure like, moderate intensity, radiating to right arm, associated with SOB, lasting about 30 min, subsiding with rest. Had second episode while receiving dialysis, subsided when dialysis prematurely ended, same characteristics as previous episode. Patient states that chest pain last 5 minutes. At present, patient denies shortness of breath, fever, chills. Patient denies chest pain at present. No previous episodes of chest pain.  Pt is current smoker, denies diabetes or elevated cholesterol. (08 May 2017 21:46)    Overnight issues            SUBJECTIVE & OBJECTIVE: Pt seen and examined at bedside.   ROS:  CONSTITUTIONAL: No fever, weight loss, or fatigue  NECK: No pain or stiffness  RESPIRATORY: No cough, wheezing, chills or hemoptysis; No shortness of breath  CARDIOVASCULAR: No chest pain, palpitations, dizziness, or leg swelling  GASTROINTESTINAL: No abdominal or epigastric pain. No nausea, vomiting, or hematemesis; No diarrhea or constipation. No melena or hematochezia.  GENITOURINARY: No dysuria, frequency, hematuria, or incontinence  NEUROLOGICAL: No headaches, memory loss, loss of strength, numbness, or tremors  SKIN: No itching, burning, rashes, or lesions   ICU Vital Signs Last 24 Hrs  T(C): 37.1, Max: 37.1 (05-09 @ 12:13)  T(F): 98.8, Max: 98.8 (05-09 @ 12:13)  HR: 67 (63 - 70)  BP: 172/83 (157/79 - 172/83)  BP(mean): --  ABP: --  ABP(mean): --  RR: 18 (16 - 19)  SpO2: 99% (96% - 100%)        MEDICATIONS  (STANDING):  lisinopril 40milliGRAM(s) Oral daily  labetalol 200milliGRAM(s) Oral every 8 hours  amLODIPine   Tablet 2.5milliGRAM(s) Oral daily  calcium acetate 667milliGRAM(s) Oral four times a day with meals  pantoprazole    Tablet 40milliGRAM(s) Oral before breakfast  nicotine -   7 mG/24Hr(s) Patch 1patch Transdermal daily  heparin  Infusion. Unit(s)/Hr IV Continuous <Continuous>  clopidogrel Tablet 75milliGRAM(s) Oral daily  aspirin enteric coated 81milliGRAM(s) Oral daily    MEDICATIONS  (PRN):  heparin  Injectable 3500Unit(s) IV Push every 6 hours PRN For aPTT less than 40        PHYSICAL EXAM:    GENERAL: NAD, well-groomed, well-developed  HEAD:  Atraumatic, Normocephalic  EYES: EOMI, PERRLA, conjunctiva and sclera clear  ENMT: Moist mucous membranes  NECK: Supple, No JVD  NERVOUS SYSTEM:  Alert & Oriented X3, Motor Strength 5/5 B/L upper and lower extremities; DTRs 2+ intact and symmetric  CHEST/LUNG: Clear to auscultation bilaterally; No rales, rhonchi, wheezing, or rubs  HEART: Regular rate and rhythm; No murmurs, rubs, or gallops  ABDOMEN: Soft, Nontender, Nondistended; Bowel sounds present  EXTREMITIES:  2+ Peripheral Pulses, No clubbing, cyanosis, or edema    LABS:                        10.4   8.3   )-----------( 239      ( 09 May 2017 05:48 )             30.8     05-09    144  |  104  |  50<H>  ----------------------------<  101<H>  4.8   |  27  |  8.14<H>    Ca    8.2<L>      09 May 2017 05:48    TPro  6.7  /  Alb  2.8<L>  /  TBili  0.6  /  DBili  x   /  AST  14<L>  /  ALT  8<L>  /  AlkPhos  132<H>  05-08    PT/INR - ( 08 May 2017 18:33 )   PT: 12.1 sec;   INR: 1.11 ratio         PTT - ( 09 May 2017 12:52 )  PTT:45.8 sec      CAPILLARY BLOOD GLUCOSE      RECENT CULTURES:      RADIOLOGY & ADDITIONAL TESTS:  Imaging Personally Reviewed:  [ ] YES      Consultant(s) Notes Reviewed:  [ ] YES     Care Discussed with [ ] Consultants [X ] Patient [ ] Family  [x ]    [x ]  Other; RN  HEALTH ISSUES - PROBLEM Dx:  Gastroesophageal reflux disease, esophagitis presence not specified: Gastroesophageal reflux disease, esophagitis presence not specified  Essential hypertension: Essential hypertension  Unstable angina pectoris: Unstable angina pectoris  ESRD (end stage renal disease): ESRD (end stage renal disease)        DVT/GI ppx  Discussed with pt @ bedside
Patient is a 56y old  Female who presents with a chief complaint of Intermittent chest pain for 2 days. (08 May 2017 21:46)      INTERVAL HPI/OVERNIGHT EVENTS:  pt. feels better, chest pain resolved    MEDICATIONS  (STANDING):  lisinopril 40milliGRAM(s) Oral daily  labetalol 200milliGRAM(s) Oral every 8 hours  amLODIPine   Tablet 2.5milliGRAM(s) Oral daily  calcium acetate 667milliGRAM(s) Oral four times a day with meals  pantoprazole    Tablet 40milliGRAM(s) Oral before breakfast  nicotine -   7 mG/24Hr(s) Patch 1patch Transdermal daily  aspirin enteric coated 81milliGRAM(s) Oral daily    MEDICATIONS  (PRN):      Allergies    No Known Allergies    Intolerances        REVIEW OF SYSTEMS:  CONSTITUTIONAL: No fever, weight loss, or fatigue  EYES: No eye pain, visual disturbances, or discharge  ENMT:  No difficulty hearing, tinnitus, vertigo; No sinus or throat pain  NECK: No pain or stiffness  BREASTS: No pain, masses, or nipple discharge  RESPIRATORY: No cough, wheezing, chills or hemoptysis; No shortness of breath  CARDIOVASCULAR: No chest pain, palpitations, dizziness, or leg swelling  GASTROINTESTINAL: No abdominal or epigastric pain. No nausea, vomiting, or hematemesis; No diarrhea or constipation. No melena or hematochezia.  GENITOURINARY: No dysuria, frequency, hematuria, or incontinence  NEUROLOGICAL: No headaches, memory loss, loss of strength, numbness, or tremors  SKIN: No itching, burning, rashes, or lesions   LYMPH NODES: No enlarged glands  ENDOCRINE: No heat or cold intolerance; No hair loss  MUSCULOSKELETAL: No joint pain or swelling; No muscle, back, or extremity pain      Vital Signs Last 24 Hrs  T(C): 36.8, Max: 37 (05-10 @ 13:23)  T(F): 98.2, Max: 98.6 (05-10 @ 13:23)  HR: 70 (65 - 80)  BP: 174/97 (168/80 - 180/87)  BP(mean): --  RR: 18 (18 - 18)  SpO2: 98% (97% - 99%)    PHYSICAL EXAM:  GENERAL: NAD, well-groomed, well-developed  HEAD:  Atraumatic, Normocephalic  EYES: EOMI, PERRLA, conjunctiva and sclera clear  ENMT: No tonsillar erythema, exudates, or enlargement; Moist mucous membranes, Good dentition, No lesions  NECK: Supple, No JVD, Normal thyroid  NERVOUS SYSTEM:  Alert & Oriented X3, Good concentration; Motor Strength 5/5 B/L upper and lower extremities; DTRs 2+ intact and symmetric  CHEST/LUNG: Clear to percussion bilaterally; No rales, rhonchi, wheezing, or rubs  HEART: Regular rate and rhythm; No murmurs, rubs, or gallops  ABDOMEN: Soft, Nontender, Nondistended; Bowel sounds present  EXTREMITIES:  2+ Peripheral Pulses, No clubbing, cyanosis, or edema  LYMPH: No lymphadenopathy noted  SKIN: No rashes or lesions    LABS:                        10.5   8.5   )-----------( 218      ( 10 May 2017 04:35 )             29.7     05-10    141  |  101  |  61<H>  ----------------------------<  96  4.5   |  26  |  9.35<H>    Ca    8.2<L>      10 May 2017 18:41      PTT - ( 10 May 2017 14:10 )  PTT:53.1 sec    CAPILLARY BLOOD GLUCOSE      RADIOLOGY & ADDITIONAL TESTS:    Imaging Personally Reviewed:  [ x] YES  [ ] NO    Consultant(s) Notes Reviewed:  [x ] YES  [ ] NO    Care Discussed with Consultants/Other Providers x[ ] YES  [ ] NO
Patient seen in follow up for ESRD; post stress test. no chest pain.    MEDICATIONS  (STANDING):  lisinopril 40milliGRAM(s) Oral daily  labetalol 200milliGRAM(s) Oral every 8 hours  amLODIPine   Tablet 2.5milliGRAM(s) Oral daily  calcium acetate 667milliGRAM(s) Oral four times a day with meals  pantoprazole    Tablet 40milliGRAM(s) Oral before breakfast  nicotine -   7 mG/24Hr(s) Patch 1patch Transdermal daily  clopidogrel Tablet 75milliGRAM(s) Oral daily  aspirin enteric coated 81milliGRAM(s) Oral daily  heparin  Infusion. 1000Unit(s)/Hr IV Continuous <Continuous>    MEDICATIONS  (PRN):  heparin  Injectable 3800Unit(s) IV Push every 6 hours PRN For aPTT less than 40    PHYSICAL EXAM:      T(C): 37, Max: 37.1 (05-09 @ 17:49)  HR: 66 (66 - 80)  BP: 180/87 (168/80 - 180/87)  RR: 18 (18 - 18)  SpO2: 97% (97% - 99%)  Wt(kg): --  Respiratory: clear anteriorly, decreased BS at bases  Cardiovascular: S1 S2  Gastrointestinal: soft NT ND +BS  Extremities:   tr edema L AVF                                    10.5   8.5   )-----------( 218      ( 10 May 2017 04:35 )             29.7     05-09    144  |  104  |  50<H>  ----------------------------<  101<H>  4.8   |  27  |  8.14<H>    Ca    8.2<L>      09 May 2017 05:48    TPro  6.7  /  Alb  2.8<L>  /  TBili  0.6  /  DBili  x   /  AST  14<L>  /  ALT  8<L>  /  AlkPhos  132<H>  05-08      LIVER FUNCTIONS - ( 08 May 2017 18:33 )  Alb: 2.8 g/dL / Pro: 6.7 gm/dL / ALK PHOS: 132 U/L / ALT: 8 U/L / AST: 14 U/L / GGT: x             Assessment and Plan:  Maintenance HD; UF as tolerated; follow stress test results.
Subjective: No further CP. 2D echo in progress      MEDICATIONS  (STANDING):  lisinopril 40milliGRAM(s) Oral daily  labetalol 200milliGRAM(s) Oral every 8 hours  amLODIPine   Tablet 2.5milliGRAM(s) Oral daily  calcium acetate 667milliGRAM(s) Oral four times a day with meals  pantoprazole    Tablet 40milliGRAM(s) Oral before breakfast  nicotine -   7 mG/24Hr(s) Patch 1patch Transdermal daily  aspirin enteric coated 81milliGRAM(s) Oral daily    MEDICATIONS  (PRN):          T(C): 37.4, Max: 37.4 (05-11 @ 05:24)  HR: 58 (58 - 70)  BP: 183/85 (174/97 - 195/91)  RR: 18 (16 - 18)  SpO2: 99% (97% - 100%)  Wt(kg): --        I&O's Detail    I & Os for current day (as of 11 May 2017 09:54)  =============================================  IN:    Oral Fluid: 240 ml    Total IN: 240 ml  ---------------------------------------------  OUT:    Other: 2000 ml    Total OUT: 2000 ml  ---------------------------------------------  Total NET: -1760 ml           PHYSICAL EXAM:    GENERAL: comfortable  EYES: EOMI, PERRLA, conjunctiva and sclera clear  NECK: Supple, no inc in JVP  CHEST/LUNG: Clear  HEART: S1S2  ABDOMEN: Soft, Nontender, Nondistended; Bowel sounds present  EXTREMITIES:  no edema  NEURO: no asterixis  ACCESS: L AVF pos thrill, bruit      LABS:  CBC Full  -  ( 11 May 2017 07:28 )  WBC Count : 6.7 K/uL  Hemoglobin : 10.3 g/dL  Hematocrit : 31.0 %  Platelet Count - Automated : 220 K/uL  Mean Cell Volume : 82.0 fl  Mean Cell Hemoglobin : 27.2 pg  Mean Cell Hemoglobin Concentration : 33.2 gm/dL  Auto Neutrophil # : x  Auto Lymphocyte # : x  Auto Monocyte # : x  Auto Eosinophil # : x  Auto Basophil # : x  Auto Neutrophil % : x  Auto Lymphocyte % : x  Auto Monocyte % : x  Auto Eosinophil % : x  Auto Basophil % : x    05-11    142  |  101  |  31<H>  ----------------------------<  121<H>  4.3   |  30  |  6.54<H>    Ca    8.4<L>      11 May 2017 07:28      PTT - ( 11 May 2017 07:28 )  PTT:33.5 sec        ASSESSMENT and PLAN:  ESRD on HD MWF at [facility], CP/angina  * Follow results of stress test, 2D echo  * Maint HD 5/12 as Rxed  * Elevated BP -- increase amlodipine to 5mg daily

## 2017-05-11 NOTE — DISCHARGE NOTE ADULT - CARE PROVIDER_API CALL
Marcos Prasad (MD; MBBS), Internal Medicine; Nephrology  [address] [place] [address]  Phone: [phone]  Fax: [phone]

## 2017-05-11 NOTE — DISCHARGE NOTE ADULT - MEDICATION SUMMARY - MEDICATIONS TO TAKE
I will START or STAY ON the medications listed below when I get home from the hospital:    lisinopril 40 mg oral tablet  -- 1 tab(s) by mouth once a day  -- Indication: For Essential hypertension    labetalol  -- 600 milligram(s) by mouth every 8 hours  -- Indication: For Essential hypertension    amLODIPine 10 mg oral tablet  -- 1 tab(s) by mouth once a day  -- Indication: For Essential hypertension    calcium acetate 667 mg oral capsule  -- 4 cap(s) by mouth 3 times a day  -- Indication: For ESRD (end stage renal disease)    PhosLo  --  by mouth   -- Indication: For ESRD (end stage renal disease)    omeprazole 20 mg oral delayed release capsule  -- 1 cap(s) by mouth once a day  -- Indication: For Gastroesophageal reflux disease, esophagitis presence not specified    nicotine 7 mg/24 hr transdermal film, extended release  -- 1 patch by transdermal patch once a day  -- Indication: For smoking cessation

## 2017-05-11 NOTE — DISCHARGE NOTE ADULT - HOSPITAL COURSE
55 y/o female PMH hypertension, ESRD, on dialysis MWF presents for 2 episodes right sided chest pain; first on Sunday, pressure like, moderate intensity, radiating to right arm, associated with SOB, lasting about 30 min, subsiding with rest. Had second episode while receiving dialysis, subsided when dialysis prematurely ended, same characteristics as previous episode. Patient states that chest pain last 5 minutes. At present, patient denies shortness of breath, fever, chills. Patient denies chest pain at present. No previous episodes of chest pain.  Pt is current smoker, denies diabetes or elevated cholesterol.  Pt. admitted with unstable angina and r/o ACS  Pt. had 3 negative cardiac enzymes and neg. stress test so no evidence of active cardiac ischemic process.  TTE showed grade I diastolic dysfunction and borderline HTN.  Pt. was continued on HD, regular schedule.

## 2017-05-11 NOTE — DISCHARGE NOTE ADULT - CARE PLAN
Principal Discharge DX:	Unstable angina pectoris  Goal:	no chest pain  Instructions for follow-up, activity and diet:	follow-up with your primary care doctor  your stress test was negative  continue with your cardiac medicines  Secondary Diagnosis:	ESRD on dialysis  Instructions for follow-up, activity and diet:	continue with dialysis and follow-up with your primary care doctor  Secondary Diagnosis:	Gastroesophageal reflux disease, esophagitis presence not specified  Instructions for follow-up, activity and diet:	continue with prilosec  Secondary Diagnosis:	Essential hypertension  Instructions for follow-up, activity and diet:	continue with your blood pressure medicines.  your echo showed grade I diastolic dysfunction , continue with beta blocker , labetalol  your echo showed borderline pulmonary hypertension - continue with your blood pressure medicines

## 2017-05-11 NOTE — DISCHARGE NOTE ADULT - OTHER SIGNIFICANT FINDINGS
EXAM:  NM NUCLEAR STRESS MULTI PHARM                            PROCEDURE DATE:  05/10/2017        IMPRESSION: Probably normal myocardial perfusion scan.    1. No scintigraphic evidence for myocardial infarct or ischemia.    2. There is normal left ventricular contractility, normal calculated   ejection fraction and normal myocardial thickening at rest. Overall post   stress ejection fraction was 54 %     3. Please see the cardiac test report for EKG findings and symptoms   during the procedure     EXAM:  TTE WO CON COMPLETE W DOPPL         PROCEDURE DATE:  05/11/2017        INTERPRETATION:  REPORT:    TRANSTHORACIC ECHOCARDIOGRAM REPORT   Summary:   1. Left ventricular ejection fraction, by visual estimation, is 60 to   65%.   2. Technically fair study.   3. Normal global left ventricular systolic function.   4. Normal left ventricular internal cavity size.   5. Spectral Doppler shows impaired relaxation pattern of left   ventricular myocardial filling (Grade I diastolic dysfunction).   6. There is mild concentric left ventricular hypertrophy.   7. Normal right ventricular size and function.   8. Mild mitral valve regurgitation.   9. Thickening and calcification of the anterior and posterior mitral   valve leaflets.  10. Sclerotic aortic valve with normal opening.  11. Estimated pulmonary artery systolic pressure is 36.7 mmHg assuming a   right atrial pressure of 5 mmHg, which is consistent with borderline   pulmonary hypertension.     Nimesh Manley MD FACC, FASE, FACP  Electronically signed on 5/11/2017 at 2:38:06 PM

## 2017-05-11 NOTE — DISCHARGE NOTE ADULT - SECONDARY DIAGNOSIS.
ESRD on dialysis Gastroesophageal reflux disease, esophagitis presence not specified Essential hypertension

## 2017-05-11 NOTE — DISCHARGE NOTE ADULT - ADDITIONAL INSTRUCTIONS
continue with your regular schedule of dialysis  follow-up with your primary care doctor in 1 to 2 weeks  call your doctor or return to ER if you have recurrent or worsening chest pain or shortness of breath

## 2017-05-11 NOTE — DISCHARGE NOTE ADULT - PLAN OF CARE
no chest pain follow-up with your primary care doctor  your stress test was negative  continue with your cardiac medicines continue with dialysis and follow-up with your primary care doctor continue with prilosec continue with your blood pressure medicines.  your echo showed grade I diastolic dysfunction , continue with beta blocker , labetalol  your echo showed borderline pulmonary hypertension - continue with your blood pressure medicines

## 2017-05-11 NOTE — DISCHARGE NOTE ADULT - PATIENT PORTAL LINK FT
“You can access the [facility] Patient Portal, offered by [facility], by registering with the following website: [email or web address]”

## 2017-05-15 DIAGNOSIS — I12.0 HYPERTENSIVE CHRONIC KIDNEY DISEASE WITH STAGE 5 CHRONIC KIDNEY DISEASE OR END STAGE RENAL DISEASE: ICD-10-CM

## 2017-05-15 DIAGNOSIS — Z86.31 PERSONAL HISTORY OF DIABETIC FOOT ULCER: ICD-10-CM

## 2017-05-15 DIAGNOSIS — Z99.2 DEPENDENCE ON RENAL DIALYSIS: ICD-10-CM

## 2017-05-15 DIAGNOSIS — R07.9 CHEST PAIN, UNSPECIFIED: ICD-10-CM

## 2017-05-15 DIAGNOSIS — Z82.49 FAMILY HISTORY OF ISCHEMIC HEART DISEASE AND OTHER DISEASES OF THE CIRCULATORY SYSTEM: ICD-10-CM

## 2017-05-15 DIAGNOSIS — N18.6 END STAGE RENAL DISEASE: ICD-10-CM

## 2017-05-15 DIAGNOSIS — K21.9 GASTRO-ESOPHAGEAL REFLUX DISEASE WITHOUT ESOPHAGITIS: ICD-10-CM

## 2017-05-15 DIAGNOSIS — I20.0 UNSTABLE ANGINA: ICD-10-CM

## 2017-05-15 DIAGNOSIS — R94.31 ABNORMAL ELECTROCARDIOGRAM [ECG] [EKG]: ICD-10-CM

## 2017-05-15 DIAGNOSIS — F17.210 NICOTINE DEPENDENCE, CIGARETTES, UNCOMPLICATED: ICD-10-CM

## 2018-01-31 NOTE — ED PROVIDER NOTE - PRINCIPAL DIAGNOSIS
[facility] with recommendations. Understanding verbalized and they will call our office if needed. He will keep his apt for Friday. Chest pain

## 2019-06-18 NOTE — PATIENT PROFILE ADULT. - AS SC BRADEN NUTRITION
----- Message from Sharlene Nieves sent at 6/18/2019 12:28 PM CDT -----  Regarding: Upcoming Appointment  Contact: [phone]  Just wanted to check how long the post op visit (set for 06-20 @ 11:10am) is estimated to take?  We're unexpectedly short staffed at work this week so I'm debating if I should reschedule.  Everything has been going well since the surgery, maybe I could cancel the appointment all together?     Thanks!  Sharlene Nieves  
Spoke with Dr. Tamez and ok to reschedule or cancel if she is doing well.  Patient states she would like to cancel, she is doing well and will call with any concerns.  
(3) adequate

## 2019-09-16 ENCOUNTER — INPATIENT (INPATIENT)
Facility: HOSPITAL | Age: 59
LOS: 2 days | Discharge: ANOTHER HOSPITAL | End: 2019-09-19
Attending: INTERNAL MEDICINE | Admitting: INTERNAL MEDICINE
Payer: MEDICARE

## 2019-09-16 VITALS
HEART RATE: 85 BPM | DIASTOLIC BLOOD PRESSURE: 120 MMHG | OXYGEN SATURATION: 99 % | HEIGHT: 62 IN | SYSTOLIC BLOOD PRESSURE: 225 MMHG | WEIGHT: 160.06 LBS | TEMPERATURE: 98 F | RESPIRATION RATE: 20 BRPM

## 2019-09-16 DIAGNOSIS — D25.9 LEIOMYOMA OF UTERUS, UNSPECIFIED: Chronic | ICD-10-CM

## 2019-09-16 DIAGNOSIS — Z90.13 ACQUIRED ABSENCE OF BILATERAL BREASTS AND NIPPLES: Chronic | ICD-10-CM

## 2019-09-16 DIAGNOSIS — N18.6 END STAGE RENAL DISEASE: ICD-10-CM

## 2019-09-16 DIAGNOSIS — I10 ESSENTIAL (PRIMARY) HYPERTENSION: ICD-10-CM

## 2019-09-16 DIAGNOSIS — K21.9 GASTRO-ESOPHAGEAL REFLUX DISEASE WITHOUT ESOPHAGITIS: ICD-10-CM

## 2019-09-16 DIAGNOSIS — R07.9 CHEST PAIN, UNSPECIFIED: ICD-10-CM

## 2019-09-16 LAB
ALBUMIN SERPL ELPH-MCNC: 3.1 G/DL — LOW (ref 3.3–5)
ALP SERPL-CCNC: 204 U/L — HIGH (ref 40–120)
ALT FLD-CCNC: 10 U/L — LOW (ref 12–78)
ANION GAP SERPL CALC-SCNC: 13 MMOL/L — SIGNIFICANT CHANGE UP (ref 5–17)
APTT BLD: 35.3 SEC — SIGNIFICANT CHANGE UP (ref 28.5–37)
AST SERPL-CCNC: 30 U/L — SIGNIFICANT CHANGE UP (ref 15–37)
BILIRUB SERPL-MCNC: 0.7 MG/DL — SIGNIFICANT CHANGE UP (ref 0.2–1.2)
BUN SERPL-MCNC: 67 MG/DL — HIGH (ref 7–23)
CALCIUM SERPL-MCNC: 9 MG/DL — SIGNIFICANT CHANGE UP (ref 8.5–10.1)
CHLORIDE SERPL-SCNC: 102 MMOL/L — SIGNIFICANT CHANGE UP (ref 96–108)
CO2 SERPL-SCNC: 26 MMOL/L — SIGNIFICANT CHANGE UP (ref 22–31)
CREAT SERPL-MCNC: 10.1 MG/DL — HIGH (ref 0.5–1.3)
GLUCOSE SERPL-MCNC: 104 MG/DL — HIGH (ref 70–99)
HCT VFR BLD CALC: 33.2 % — LOW (ref 34.5–45)
HGB BLD-MCNC: 11.1 G/DL — LOW (ref 11.5–15.5)
INR BLD: 1.06 RATIO — SIGNIFICANT CHANGE UP (ref 0.88–1.16)
MCHC RBC-ENTMCNC: 25.5 PG — LOW (ref 27–34)
MCHC RBC-ENTMCNC: 33.4 GM/DL — SIGNIFICANT CHANGE UP (ref 32–36)
MCV RBC AUTO: 76.3 FL — LOW (ref 80–100)
NRBC # BLD: 0 /100 WBCS — SIGNIFICANT CHANGE UP (ref 0–0)
NT-PROBNP SERPL-SCNC: HIGH PG/ML (ref 0–125)
PLATELET # BLD AUTO: 169 K/UL — SIGNIFICANT CHANGE UP (ref 150–400)
POTASSIUM SERPL-MCNC: 4.8 MMOL/L — SIGNIFICANT CHANGE UP (ref 3.5–5.3)
POTASSIUM SERPL-SCNC: 4.8 MMOL/L — SIGNIFICANT CHANGE UP (ref 3.5–5.3)
PROT SERPL-MCNC: 7.2 GM/DL — SIGNIFICANT CHANGE UP (ref 6–8.3)
PROTHROM AB SERPL-ACNC: 11.9 SEC — SIGNIFICANT CHANGE UP (ref 10–12.9)
RBC # BLD: 4.35 M/UL — SIGNIFICANT CHANGE UP (ref 3.8–5.2)
RBC # FLD: 20.2 % — HIGH (ref 10.3–14.5)
SODIUM SERPL-SCNC: 141 MMOL/L — SIGNIFICANT CHANGE UP (ref 135–145)
TROPONIN I SERPL-MCNC: 0.05 NG/ML — HIGH (ref 0.01–0.04)
TROPONIN I SERPL-MCNC: 0.05 NG/ML — HIGH (ref 0.01–0.04)
WBC # BLD: 5.65 K/UL — SIGNIFICANT CHANGE UP (ref 3.8–10.5)
WBC # FLD AUTO: 5.65 K/UL — SIGNIFICANT CHANGE UP (ref 3.8–10.5)

## 2019-09-16 PROCEDURE — 71045 X-RAY EXAM CHEST 1 VIEW: CPT | Mod: 26

## 2019-09-16 PROCEDURE — 99285 EMERGENCY DEPT VISIT HI MDM: CPT

## 2019-09-16 PROCEDURE — 99223 1ST HOSP IP/OBS HIGH 75: CPT | Mod: GC

## 2019-09-16 PROCEDURE — 93010 ELECTROCARDIOGRAM REPORT: CPT

## 2019-09-16 PROCEDURE — 99223 1ST HOSP IP/OBS HIGH 75: CPT | Mod: AI

## 2019-09-16 RX ORDER — CALCIUM ACETATE 667 MG
1334 TABLET ORAL
Refills: 0 | Status: DISCONTINUED | OUTPATIENT
Start: 2019-09-16 | End: 2019-09-19

## 2019-09-16 RX ORDER — ONDANSETRON 8 MG/1
4 TABLET, FILM COATED ORAL ONCE
Refills: 0 | Status: COMPLETED | OUTPATIENT
Start: 2019-09-16 | End: 2019-09-16

## 2019-09-16 RX ORDER — MORPHINE SULFATE 50 MG/1
4 CAPSULE, EXTENDED RELEASE ORAL ONCE
Refills: 0 | Status: DISCONTINUED | OUTPATIENT
Start: 2019-09-16 | End: 2019-09-16

## 2019-09-16 RX ORDER — LABETALOL HCL 100 MG
10 TABLET ORAL ONCE
Refills: 0 | Status: COMPLETED | OUTPATIENT
Start: 2019-09-16 | End: 2019-09-16

## 2019-09-16 RX ORDER — PANTOPRAZOLE SODIUM 20 MG/1
40 TABLET, DELAYED RELEASE ORAL
Refills: 0 | Status: DISCONTINUED | OUTPATIENT
Start: 2019-09-16 | End: 2019-09-19

## 2019-09-16 RX ORDER — NICOTINE POLACRILEX 2 MG
1 GUM BUCCAL DAILY
Refills: 0 | Status: DISCONTINUED | OUTPATIENT
Start: 2019-09-16 | End: 2019-09-19

## 2019-09-16 RX ORDER — LABETALOL HCL 100 MG
600 TABLET ORAL EVERY 8 HOURS
Refills: 0 | Status: DISCONTINUED | OUTPATIENT
Start: 2019-09-16 | End: 2019-09-17

## 2019-09-16 RX ORDER — LISINOPRIL 2.5 MG/1
40 TABLET ORAL ONCE
Refills: 0 | Status: COMPLETED | OUTPATIENT
Start: 2019-09-16 | End: 2019-09-16

## 2019-09-16 RX ORDER — INFLUENZA VIRUS VACCINE 15; 15; 15; 15 UG/.5ML; UG/.5ML; UG/.5ML; UG/.5ML
0.5 SUSPENSION INTRAMUSCULAR ONCE
Refills: 0 | Status: DISCONTINUED | OUTPATIENT
Start: 2019-09-16 | End: 2019-09-19

## 2019-09-16 RX ORDER — AMLODIPINE BESYLATE 2.5 MG/1
10 TABLET ORAL DAILY
Refills: 0 | Status: DISCONTINUED | OUTPATIENT
Start: 2019-09-16 | End: 2019-09-19

## 2019-09-16 RX ORDER — LISINOPRIL 2.5 MG/1
40 TABLET ORAL DAILY
Refills: 0 | Status: DISCONTINUED | OUTPATIENT
Start: 2019-09-16 | End: 2019-09-19

## 2019-09-16 RX ORDER — AMLODIPINE BESYLATE 2.5 MG/1
10 TABLET ORAL ONCE
Refills: 0 | Status: COMPLETED | OUTPATIENT
Start: 2019-09-16 | End: 2019-09-16

## 2019-09-16 RX ADMIN — AMLODIPINE BESYLATE 10 MILLIGRAM(S): 2.5 TABLET ORAL at 22:37

## 2019-09-16 RX ADMIN — Medication 10 MILLIGRAM(S): at 14:11

## 2019-09-16 RX ADMIN — Medication 1 PATCH: at 23:05

## 2019-09-16 RX ADMIN — Medication 10 MILLIGRAM(S): at 15:57

## 2019-09-16 RX ADMIN — Medication 10 MILLIGRAM(S): at 14:58

## 2019-09-16 RX ADMIN — LISINOPRIL 40 MILLIGRAM(S): 2.5 TABLET ORAL at 22:41

## 2019-09-16 RX ADMIN — Medication 600 MILLIGRAM(S): at 22:37

## 2019-09-16 RX ADMIN — ONDANSETRON 4 MILLIGRAM(S): 8 TABLET, FILM COATED ORAL at 15:57

## 2019-09-16 RX ADMIN — MORPHINE SULFATE 4 MILLIGRAM(S): 50 CAPSULE, EXTENDED RELEASE ORAL at 15:57

## 2019-09-16 NOTE — ED PROVIDER NOTE - OBJECTIVE STATEMENT
Pt is a 57 yo lady with a pmhx of HTN, ESRDon HD, MWF who presents to the ED with chest pain and sob. Started on Friday, has been constant. Did not get dialysis today because of the chest pain, last time was Friday. No cough, no fevers, no dysuria, has had hx of chest pain in the past with negative stress test.

## 2019-09-16 NOTE — H&P ADULT - ASSESSMENT
58f with history of end stage renal disease presents with chest pain which is not typical and she had a NEGATIVE stress test in 5/17       IMPROVE VTE Individual Risk Assessment          RISK                                                          Points  [  ] Previous VTE                                                3  [  ] Thrombophilia                                             2  [  ] Lower limb paralysis                                   2        (unable to hold up >15 seconds)    [  ] Current Cancer                                             2         (within 6 months)  [  ] Immobilization > 24 hrs                              1  [  ] ICU/CCU stay > 24 hours                             1  [  ] Age > 60                                                         1    IMPROVE VTE Score: 0

## 2019-09-16 NOTE — CONSULT NOTE ADULT - SUBJECTIVE AND OBJECTIVE BOX
Patient is a 58y old  Female who presents with a chief complaint of chest pain (16 Sep 2019 16:37)      Pt is a 57 yo lady with a pmhx of HTN, ESRDon HD, MWF who presents to the ED with chest pain and sob. Started on Friday, has been constant. Did not get dialysis today because of the chest pain, last HD was Friday removed about 1.6L fluids.  has had hx of chest pain in the past with negative stress test.    patient pain was all night and was associated with short of breath while walking  and nausea, not been able to tolerated po anti hypertensive pain medication.- patient has history of gerd- (16 Sep 2019 16:37)  In Ed found with / 120, patient given IV labetalol 10mg x3, BP remain 232/ 123  Patient seen and evaluated, denies chest pain, or sob at the time, or nausea, reports home BP is in the 160's, said was not able to keep in all po medication at home since friday night due to nausea.  BNP was 950894, and troponin of 0.051 with unremarkable EKG.      PAST MEDICAL & SURGICAL HISTORY:  GERD (gastroesophageal reflux disease)  ESRD on dialysis  HTN (hypertension)  Fibroid, uterine  History of bilateral breast reduction surgery    FAMILY HISTORY:  Family history of acute myocardial infarction (Father)  Family history of diabetes mellitus (DM) (Father, Mother)  Family history of essential hypertension (Father, Mother)    Social History: Allergies    No Known Allergies    Intolerances        MEDICATIONS  (STANDING):  amLODIPine   Tablet 10 milliGRAM(s) Oral daily  calcium acetate 1334 milliGRAM(s) Oral four times a day with meals  labetalol 600 milliGRAM(s) Oral every 8 hours  lisinopril 40 milliGRAM(s) Oral daily  nicotine - 21 mG/24Hr(s) Patch 1 patch Transdermal daily  pantoprazole    Tablet 40 milliGRAM(s) Oral before breakfast    MEDICATIONS  (PRN):      REVIEW OF SYSTEMS:    CONSTITUTIONAL: No fever, weight loss, or fatigue  EYES: No eye pain, visual disturbances, or discharge  ENMT:  No difficulty hearing, tinnitus, vertigo; No sinus or throat pain  NECK: No pain or stiffness  BREASTS: No pain, masses, or nipple discharge  RESPIRATORY: No cough, wheezing, chills or hemoptysis; No shortness of breath  CARDIOVASCULAR: No chest pain, palpitations, dizziness, or leg swelling  GASTROINTESTINAL: No abdominal or epigastric pain. No nausea, vomiting, or hematemesis; No diarrhea or constipation. No melena or hematochezia.  GENITOURINARY: No dysuria, frequency, hematuria, or incontinence  NEUROLOGICAL: No headaches, memory loss, loss of strength, numbness, or tremors  SKIN: No itching, burning, rashes, or lesions   LYMPH NODES: No enlarged glands  ENDOCRINE: No heat or cold intolerance; No hair loss  MUSCULOSKELETAL: No joint pain or swelling; No muscle, back, or extremity pain  PSYCHIATRIC: No depression, anxiety, mood swings, or difficulty sleeping  HEME/LYMPH: No easy bruising, or bleeding gums  ALLERGY AND IMMUNOLOGIC: No hives or eczema      PHYSICAL EXAM:    T(C): 36.9 (16 Sep 2019 17:55), Max: 36.9 (16 Sep 2019 11:44)  T(F): 98.4 (16 Sep 2019 17:55), Max: 98.5 (16 Sep 2019 11:44)  HR: 79 (16 Sep 2019 17:55) (78 - 85)  BP: 200/111 (16 Sep 2019 17:55) (200/111 - 232/123)  RR: 16 (16 Sep 2019 17:55) (16 - 22)  SpO2: 99% (16 Sep 2019 17:55) (92% - 99%)    GENERAL: NAD, well-groomed, well-developed  HEAD:  Atraumatic, Normocephalic  EYES: EOMI, PERRLA, conjunctiva and sclera clear  NECK: Supple, No JVD, Normal thyroid  NERVOUS SYSTEM:  Alert & Oriented X3, Good concentration;   Motor Strength 5/5 B/L upper and lower extremities; DTRs 2+ intact and symmetric  CHEST/LUNG: CTAbilaterally; No rales, rhonchi, wheezing, or rubs  HEART: Regular rate and rhythm; No murmurs, rubs, or gallops  ABDOMEN: Soft, Nontender, Nondistended; Bowel sounds present  EXTREMITIES:  2+ Peripheral Pulses, No clubbing, cyanosis, or edema  SKIN: No rashes or lesions        LABS:                        11.1   5.65  )-----------( 169      ( 16 Sep 2019 12:57 )             33.2     09-16    141  |  102  |  67<H>  ----------------------------<  104<H>  4.8   |  26  |  10.10<H>    Ca    9.0      16 Sep 2019 12:57    TPro  7.2  /  Alb  3.1<L>  /  TBili  0.7  /  DBili  x   /  AST  30  /  ALT  10<L>  /  AlkPhos  204<H>  09-16    PT/INR - ( 16 Sep 2019 12:57 )   PT: 11.9 sec;   INR: 1.06 ratio         PTT - ( 16 Sep 2019 12:57 )  PTT:35.3 sec          RADIOLOGY & ADDITIONAL STUDIES:            CRITICAL CARE TIME SPENT: 35min Patient is a 58y old  Female who presents with a chief complaint of chest pain (16 Sep 2019 16:37)      Pt is a 57 yo lady with a pmhx of HTN, ESRDon HD, MWF who presents to the ED with chest pain and sob. Started on Friday, has been constant. Did not get dialysis today because of the chest pain, last HD was Friday removed about 1.6L fluids.  has had hx of chest pain in the past with negative stress test.    patient pain was all night and was associated with short of breath while walking  and nausea, not been able to tolerated po anti hypertensive pain medication.- patient has history of gerd- (16 Sep 2019 16:37)  In Ed found with / 120, patient given IV labetalol 10mg x3, BP remain 232/ 123  Patient seen and evaluated, denies chest pain, or sob at the time, or nausea, reports home BP is in the 160's, said was not able to keep in all po medication at home since friday night due to nausea.  BNP was [id number], and troponin of 0.051 with unremarkable EKG.      PAST MEDICAL & SURGICAL HISTORY:  GERD (gastroesophageal reflux disease)  ESRD on dialysis  HTN (hypertension)  Fibroid, uterine  History of bilateral breast reduction surgery    FAMILY HISTORY:  Family history of acute myocardial infarction (Father)  Family history of diabetes mellitus (DM) (Father, Mother)  Family history of essential hypertension (Father, Mother)    Social History: Allergies    No Known Allergies    Intolerances        MEDICATIONS  (STANDING):  amLODIPine   Tablet 10 milliGRAM(s) Oral daily  calcium acetate 1334 milliGRAM(s) Oral four times a day with meals  labetalol 600 milliGRAM(s) Oral every 8 hours  lisinopril 40 milliGRAM(s) Oral daily  nicotine - 21 mG/24Hr(s) Patch 1 patch Transdermal daily  pantoprazole    Tablet 40 milliGRAM(s) Oral before breakfast    MEDICATIONS  (PRN):      REVIEW OF SYSTEMS:    CONSTITUTIONAL: No fever, weight loss, or fatigue  EYES: No eye pain, visual disturbances, or discharge  ENMT:  No difficulty hearing, tinnitus, vertigo; No sinus or throat pain  NECK: No pain or stiffness  BREASTS: No pain, masses, or nipple discharge  RESPIRATORY: No cough, wheezing, chills or hemoptysis; No shortness of breath  CARDIOVASCULAR:  chest pain, resolving, palpitations, dizziness, or leg swelling  GASTROINTESTINAL: No abdominal or epigastric pain. No nausea, vomiting, or hematemesis; No diarrhea or constipation. No melena or hematochezia.  GENITOURINARY: No dysuria, frequency, hematuria, or incontinence  NEUROLOGICAL: No headaches, memory loss, loss of strength, numbness, or tremors  SKIN: No itching, burning, rashes, or lesions   LYMPH NODES: No enlarged glands  ENDOCRINE: No heat or cold intolerance; No hair loss  MUSCULOSKELETAL: No joint pain or swelling; No muscle, back, or extremity pain  PSYCHIATRIC: No depression, anxiety, mood swings, or difficulty sleeping  HEME/LYMPH: No easy bruising, or bleeding gums  ALLERGY AND IMMUNOLOGIC: No hives or eczema      PHYSICAL EXAM:    T(C): 36.9 (16 Sep 2019 17:55), Max: 36.9 (16 Sep 2019 11:44)  T(F): 98.4 (16 Sep 2019 17:55), Max: 98.5 (16 Sep 2019 11:44)  HR: 79 (16 Sep 2019 17:55) (78 - 85)  BP: 200/111 (16 Sep 2019 17:55) (200/111 - 232/123)  RR: 16 (16 Sep 2019 17:55) (16 - 22)  SpO2: 99% (16 Sep 2019 17:55) (92% - 99%)    GENERAL: NAD, well-groomed, well-developed  HEAD:  Atraumatic, Normocephalic  EYES: EOMI, PERRLA, conjunctiva and sclera clear  NECK: Supple, No JVD, Normal thyroid  NERVOUS SYSTEM:  Alert & Oriented X3, Good concentration;   CHEST/LUNG: CTA bilaterally; No rales, rhonchi, wheezing, or rubs  HEART: Regular rate and rhythm; No murmurs, rubs, or gallops  ABDOMEN: Soft, Nontender, Nondistended; Bowel sounds present  EXTREMITIES:  2+ Peripheral Pulses, LUE: fistula +bruit + thrill  SKIN: No rashes or lesions        LABS:                        11.1   5.65  )-----------( 169      ( 16 Sep 2019 12:57 )             33.2     09-16    141  |  102  |  67<H>  ----------------------------<  104<H>  4.8   |  26  |  10.10<H>    Ca    9.0      16 Sep 2019 12:57    TPro  7.2  /  Alb  3.1<L>  /  TBili  0.7  /  DBili  x   /  AST  30  /  ALT  10<L>  /  AlkPhos  204<H>  09-16    PT/INR - ( 16 Sep 2019 12:57 )   PT: 11.9 sec;   INR: 1.06 ratio         PTT - ( 16 Sep 2019 12:57 )  PTT:35.3 sec          RADIOLOGY & ADDITIONAL STUDIES:  < from: Xray Chest 1 View AP/PA. (09.16.19 @ 13:37) >  Cardiomegaly and CHF.        CRITICAL CARE TIME SPENT: 35min

## 2019-09-16 NOTE — H&P ADULT - NSICDXFAMILYHX_GEN_ALL_CORE_FT
FAMILY HISTORY:  Father  Still living? No  Family history of acute myocardial infarction, Age at diagnosis: Age Unknown  Family history of diabetes mellitus (DM), Age at diagnosis: Age Unknown  Family history of essential hypertension, Age at diagnosis: Age Unknown    Mother  Still living? No  Family history of diabetes mellitus (DM), Age at diagnosis: Age Unknown  Family history of essential hypertension, Age at diagnosis: Age Unknown

## 2019-09-16 NOTE — ED ADULT NURSE REASSESSMENT NOTE - NS ED NURSE REASSESS COMMENT FT1
Denzel Orona agreed to take patient for dialysis and will collect gold tube for pending collection labs.

## 2019-09-16 NOTE — CONSULT NOTE ADULT - ATTENDING COMMENTS
57 y/o F w/HTN and ESRD presenting with chest discomfort found to have hypertensive urgency. Likely secondary to volume overload + inability to take oral antihypertensives for past few days.    - Zofran for nausea  - PO antihypertensives once nasuea is controlled  - IV labetalol PRN  - HD  - No need for ICU level of care at this time

## 2019-09-16 NOTE — H&P ADULT - HISTORY OF PRESENT ILLNESS
Pt is a 57 yo lady with a pmhx of HTN, ESRDon HD, MWF who presents to the ED with chest pain and sob. Started on Friday, has been constant. Did not get dialysis today because of the chest pain, last time was Friday. No cough, no fevers, no dysuria, has had hx of chest pain in the past with negative stress test. Pt is a 59 yo lady with a pmhx of HTN, ESRDon HD, MWF who presents to the ED with chest pain and sob. Started on Friday, has been constant. Did not get dialysis today because of the chest pain, last time was Friday. No cough, no fevers, no dysuria, has had hx of chest pain in the past with negative stress test.  patient pain was all night and was associatecd with short of breath while walking  and nausea - patient has history of gerd-

## 2019-09-16 NOTE — ED PROVIDER NOTE - CLINICAL SUMMARY MEDICAL DECISION MAKING FREE TEXT BOX
Ddx: ACS/ HEART score 4/ Fluid overload/ hypertensive urgency/emergency  Plan: Cbc, cmp, lipase, cxr, renal

## 2019-09-16 NOTE — CONSULT NOTE ADULT - SUBJECTIVE AND OBJECTIVE BOX
CC: Nausea and vomiting; chest pain    HPI Patient presents with intermittent nausea and vomiting since late Friday. Unable to keep medications down. Completed HD Friday without incident but unable to tolerate BP medications over the last 2 days;  Noted elevated /120 - 232/123; not improving with labetalol 10 mg X 3  No headache;       PAST MEDICAL & SURGICAL HISTORY:  GERD (gastroesophageal reflux disease)  ESRD on dialysis  HTN (hypertension)  Fibroid, uterine  History of bilateral breast reduction surgery    FAMILY HISTORY:  Family history of acute myocardial infarction (Father)  Family history of diabetes mellitus (DM) (Father, Mother)  Family history of essential hypertension (Father, Mother)    Allergies    No Known Allergies    Intolerances      Home Medications:  amLODIPine 10 mg oral tablet: 1 tab(s) orally once a day (08 May 2017 18:14)  calcium acetate 667 mg oral capsule: 4 cap(s) orally 3 times a day (08 May 2017 18:14)  labetalol: 600 milligram(s) orally every 8 hours (08 May 2017 18:14)  lisinopril 40 mg oral tablet: 1 tab(s) orally once a day (08 May 2017 18:14)  omeprazole 20 mg oral delayed release capsule: 1 cap(s) orally once a day (08 May 2017 18:14)  PhosLo:  orally  (08 May 2017 18:14)    MEDICATIONS  (STANDING):    MEDICATIONS  (PRN):    Vital Signs Last 24 Hrs  T(C): 36.4 (16 Sep 2019 15:37), Max: 36.9 (16 Sep 2019 11:44)  T(F): 97.6 (16 Sep 2019 15:37), Max: 98.5 (16 Sep 2019 11:44)  HR: 82 (16 Sep 2019 15:37) (82 - 85)  BP: 232/123 (16 Sep 2019 15:37) (225/120 - 232/123)  BP(mean): --  RR: 22 (16 Sep 2019 15:37) (20 - 22)  SpO2: 92% (16 Sep 2019 15:37) (92% - 99%)    Daily Height in cm: 157.48 (16 Sep 2019 11:44)    Daily     CAPILLARY BLOOD GLUCOSE        PHYSICAL EXAM:      T(C): 36.4 (09-16-19 @ 15:37), Max: 36.9 (09-16-19 @ 11:44)  HR: 82 (09-16-19 @ 15:37) (82 - 85)  BP: 232/123 (09-16-19 @ 15:37) (225/120 - 232/123)  RR: 22 (09-16-19 @ 15:37) (20 - 22)  SpO2: 92% (09-16-19 @ 15:37) (92% - 99%)  Wt(kg): --  Respiratory: clear anteriorly, decreased BS at bases  Cardiovascular: S1 S2  Gastrointestinal: soft NT ND +BS  Extremities:   1 edema  LUE avf + bruit and thrill              09-16    141  |  102  |  67<H>  ----------------------------<  104<H>  4.8   |  26  |  10.10<H>    Ca    9.0      16 Sep 2019 12:57    TPro  7.2  /  Alb  3.1<L>  /  TBili  0.7  /  DBili  x   /  AST  30  /  ALT  10<L>  /  AlkPhos  204<H>  09-16                          11.1   5.65  )-----------( 169      ( 16 Sep 2019 12:57 )             33.2     Creatinine Trend: 10.10<--  Troponin I, Serum: .051: TYPE:(C=Critical, N=Notification, A=Abnormal) A  TESTS: TROPONIN  DATE/TIME CALLED: 09/16/19 13:51  CALLED TO: NAMRATA PIKE RN-09/16/19 13:52  READ BACK (2 Patient Identifiers)(Y/N): Y  READ BACK VALUES (Y/N): Y  CALLED BY: CHARLES  The new reference range for Troponin-I performed on the Siemens Vista  system is 0.015-0.045 ng/mL, which includes the 99th percentile of a  healthy reference population. Studies have shown that elevated troponin  levels above the 99th percentile cutoff are associated with an increased  risk for adverse cardiac events, with the risk increasing as troponin  levels increase. As per a joint committee of the American College of  Cardiology and European Society of Cardiology, diagnosis of classic MI is  based upon the detection of a rise or fall of cardiac troponin values,  with at least one value above the 99th percentile upper reference limit,  in the appropriate clinical context.  Troponin-I (ng/mL) Interpretation  0.00-0.045 Normal range (includes the 99th percentile of a healthy  reference population)  >0.045 Elevated troponin level indicating increased risk  Note: Troponin-I and Troponin-T cannot be used interchangeably in serial  measurements. Minimally elevated Troponin results should be interpreted  in the context of clinical findings and risk factors. ng/mL (09.16.19 @ 12:57)        Assessment and Plan    ESRD; HTN urgency vs emergency;  r/o ACS; unable to tolerate medications;  ICU/ CCU evaluation; IV antihypertensive rx  Fluid overload component, will arrange for HD today; CC: Nausea and vomiting; chest pain    HPI Patient presents with intermittent nausea and vomiting since late Friday. Unable to keep medications down. Completed HD Friday without incident but unable to tolerate BP medications over the last 2 days;  Noted elevated /120 - 232/123; not improving with labetalol 10 mg X 3  No headache;       PAST MEDICAL & SURGICAL HISTORY:  GERD (gastroesophageal reflux disease)  ESRD on dialysis  HTN (hypertension)  Fibroid, uterine  History of bilateral breast reduction surgery    FAMILY HISTORY:  Family history of acute myocardial infarction (Father)  Family history of diabetes mellitus (DM) (Father, Mother)  Family history of essential hypertension (Father, Mother)    Allergies    No Known Allergies    Intolerances      Home Medications:  amLODIPine 10 mg oral tablet: 1 tab(s) orally once a day (08 May 2017 18:14)  calcium acetate 667 mg oral capsule: 4 cap(s) orally 3 times a day (08 May 2017 18:14)  labetalol: 600 milligram(s) orally every 8 hours (08 May 2017 18:14)  lisinopril 40 mg oral tablet: 1 tab(s) orally once a day (08 May 2017 18:14)  omeprazole 20 mg oral delayed release capsule: 1 cap(s) orally once a day (08 May 2017 18:14)  PhosLo:  orally  (08 May 2017 18:14)    MEDICATIONS  (STANDING):    MEDICATIONS  (PRN):    Vital Signs Last 24 Hrs  T(C): 36.4 (16 Sep 2019 15:37), Max: 36.9 (16 Sep 2019 11:44)  T(F): 97.6 (16 Sep 2019 15:37), Max: 98.5 (16 Sep 2019 11:44)  HR: 82 (16 Sep 2019 15:37) (82 - 85)  BP: 232/123 (16 Sep 2019 15:37) (225/120 - 232/123)  BP(mean): --  RR: 22 (16 Sep 2019 15:37) (20 - 22)  SpO2: 92% (16 Sep 2019 15:37) (92% - 99%)    Daily Height in cm: 157.48 (16 Sep 2019 11:44)    Daily     CAPILLARY BLOOD GLUCOSE        PHYSICAL EXAM:      T(C): 36.4 (09-16-19 @ 15:37), Max: 36.9 (09-16-19 @ 11:44)  HR: 82 (09-16-19 @ 15:37) (82 - 85)  BP: 232/123 (09-16-19 @ 15:37) (225/120 - 232/123)  RR: 22 (09-16-19 @ 15:37) (20 - 22)  SpO2: 92% (09-16-19 @ 15:37) (92% - 99%)  Wt(kg): --  Respiratory: clear anteriorly, decreased BS at bases  Cardiovascular: S1 S2  Gastrointestinal: soft NT ND +BS  Extremities:   1 edema  LUE avf + bruit and thrill              09-16    141  |  102  |  67<H>  ----------------------------<  104<H>  4.8   |  26  |  10.10<H>    Ca    9.0      16 Sep 2019 12:57    TPro  7.2  /  Alb  3.1<L>  /  TBili  0.7  /  DBili  x   /  AST  30  /  ALT  10<L>  /  AlkPhos  204<H>  09-16                          11.1   5.65  )-----------( 169      ( 16 Sep 2019 12:57 )             33.2     Creatinine Trend: 10.10<--  Troponin I, Serum: .051: TYPE:(C=Critical, N=Notification, A=Abnormal) A  TESTS: TROPONIN  DATE/TIME CALLED: 09/16/19 13:51  Assessment and Plan    ESRD; HTN urgency vs emergency;  r/o ACS; unable to tolerate medications;  ICU/ CCU evaluation; IV antihypertensive rx  Fluid overload component, will arrange for HD today;   Will follow.

## 2019-09-16 NOTE — ED ADULT NURSE REASSESSMENT NOTE - NS ED NURSE REASSESS COMMENT FT1
Patient alert and oriented. No signs of distress. Patient to go to dialysis as per via JONNA Orona and MD Vaughn. To place okay to go to diaylsis with current BP and off telemetry. Patient verbalized understanding of POC.

## 2019-09-16 NOTE — H&P ADULT - NSICDXPASTMEDICALHX_GEN_ALL_CORE_FT
PAST MEDICAL HISTORY:  ESRD on dialysis     GERD (gastroesophageal reflux disease)     HTN (hypertension)

## 2019-09-17 LAB
ANION GAP SERPL CALC-SCNC: 10 MMOL/L — SIGNIFICANT CHANGE UP (ref 5–17)
BUN SERPL-MCNC: 26 MG/DL — HIGH (ref 7–23)
CALCIUM SERPL-MCNC: 8.7 MG/DL — SIGNIFICANT CHANGE UP (ref 8.5–10.1)
CHLORIDE SERPL-SCNC: 98 MMOL/L — SIGNIFICANT CHANGE UP (ref 96–108)
CO2 SERPL-SCNC: 29 MMOL/L — SIGNIFICANT CHANGE UP (ref 22–31)
CREAT SERPL-MCNC: 5.68 MG/DL — HIGH (ref 0.5–1.3)
GLUCOSE SERPL-MCNC: 94 MG/DL — SIGNIFICANT CHANGE UP (ref 70–99)
HAV IGM SER-ACNC: SIGNIFICANT CHANGE UP
HBV CORE IGM SER-ACNC: SIGNIFICANT CHANGE UP
HBV SURFACE AB SER-ACNC: REACTIVE
HBV SURFACE AG SER-ACNC: SIGNIFICANT CHANGE UP
HCT VFR BLD CALC: 30.7 % — LOW (ref 34.5–45)
HCV AB S/CO SERPL IA: 0.15 S/CO — SIGNIFICANT CHANGE UP (ref 0–0.99)
HCV AB S/CO SERPL IA: 0.17 S/CO — SIGNIFICANT CHANGE UP (ref 0–0.99)
HCV AB SERPL-IMP: SIGNIFICANT CHANGE UP
HCV AB SERPL-IMP: SIGNIFICANT CHANGE UP
HGB BLD-MCNC: 10.3 G/DL — LOW (ref 11.5–15.5)
MCHC RBC-ENTMCNC: 25.7 PG — LOW (ref 27–34)
MCHC RBC-ENTMCNC: 33.6 GM/DL — SIGNIFICANT CHANGE UP (ref 32–36)
MCV RBC AUTO: 76.6 FL — LOW (ref 80–100)
NRBC # BLD: 0 /100 WBCS — SIGNIFICANT CHANGE UP (ref 0–0)
PLATELET # BLD AUTO: 184 K/UL — SIGNIFICANT CHANGE UP (ref 150–400)
POTASSIUM SERPL-MCNC: 4.1 MMOL/L — SIGNIFICANT CHANGE UP (ref 3.5–5.3)
POTASSIUM SERPL-SCNC: 4.1 MMOL/L — SIGNIFICANT CHANGE UP (ref 3.5–5.3)
RBC # BLD: 4.01 M/UL — SIGNIFICANT CHANGE UP (ref 3.8–5.2)
RBC # FLD: 20 % — HIGH (ref 10.3–14.5)
SODIUM SERPL-SCNC: 137 MMOL/L — SIGNIFICANT CHANGE UP (ref 135–145)
TROPONIN I SERPL-MCNC: 0.04 NG/ML — SIGNIFICANT CHANGE UP (ref 0.01–0.04)
TROPONIN I SERPL-MCNC: 0.04 NG/ML — SIGNIFICANT CHANGE UP (ref 0.01–0.04)
WBC # BLD: 5.21 K/UL — SIGNIFICANT CHANGE UP (ref 3.8–10.5)
WBC # FLD AUTO: 5.21 K/UL — SIGNIFICANT CHANGE UP (ref 3.8–10.5)

## 2019-09-17 PROCEDURE — 99233 SBSQ HOSP IP/OBS HIGH 50: CPT

## 2019-09-17 PROCEDURE — 99223 1ST HOSP IP/OBS HIGH 75: CPT

## 2019-09-17 RX ORDER — ASPIRIN/CALCIUM CARB/MAGNESIUM 324 MG
81 TABLET ORAL DAILY
Refills: 0 | Status: DISCONTINUED | OUTPATIENT
Start: 2019-09-17 | End: 2019-09-19

## 2019-09-17 RX ORDER — ATORVASTATIN CALCIUM 80 MG/1
10 TABLET, FILM COATED ORAL AT BEDTIME
Refills: 0 | Status: DISCONTINUED | OUTPATIENT
Start: 2019-09-17 | End: 2019-09-19

## 2019-09-17 RX ORDER — LABETALOL HCL 100 MG
600 TABLET ORAL EVERY 8 HOURS
Refills: 0 | Status: DISCONTINUED | OUTPATIENT
Start: 2019-09-17 | End: 2019-09-19

## 2019-09-17 RX ORDER — HEPARIN SODIUM 5000 [USP'U]/ML
5000 INJECTION INTRAVENOUS; SUBCUTANEOUS EVERY 8 HOURS
Refills: 0 | Status: DISCONTINUED | OUTPATIENT
Start: 2019-09-17 | End: 2019-09-18

## 2019-09-17 RX ADMIN — Medication 1334 MILLIGRAM(S): at 21:52

## 2019-09-17 RX ADMIN — Medication 600 MILLIGRAM(S): at 05:12

## 2019-09-17 RX ADMIN — PANTOPRAZOLE SODIUM 40 MILLIGRAM(S): 20 TABLET, DELAYED RELEASE ORAL at 05:12

## 2019-09-17 RX ADMIN — Medication 1 PATCH: at 07:07

## 2019-09-17 RX ADMIN — Medication 1334 MILLIGRAM(S): at 17:51

## 2019-09-17 RX ADMIN — ATORVASTATIN CALCIUM 10 MILLIGRAM(S): 80 TABLET, FILM COATED ORAL at 21:52

## 2019-09-17 RX ADMIN — Medication 600 MILLIGRAM(S): at 12:35

## 2019-09-17 RX ADMIN — AMLODIPINE BESYLATE 10 MILLIGRAM(S): 2.5 TABLET ORAL at 05:12

## 2019-09-17 RX ADMIN — Medication 1 PATCH: at 11:11

## 2019-09-17 RX ADMIN — HEPARIN SODIUM 5000 UNIT(S): 5000 INJECTION INTRAVENOUS; SUBCUTANEOUS at 21:52

## 2019-09-17 RX ADMIN — Medication 600 MILLIGRAM(S): at 21:52

## 2019-09-17 RX ADMIN — Medication 81 MILLIGRAM(S): at 17:51

## 2019-09-17 RX ADMIN — Medication 1 PATCH: at 21:57

## 2019-09-17 RX ADMIN — Medication 1334 MILLIGRAM(S): at 12:35

## 2019-09-17 RX ADMIN — LISINOPRIL 40 MILLIGRAM(S): 2.5 TABLET ORAL at 05:12

## 2019-09-17 NOTE — CONSULT NOTE ADULT - SUBJECTIVE AND OBJECTIVE BOX
CARDIOLOGY CONSULT NOTE    Patient is a 58y Female with a known history of :  Anemia due to chronic kidney disease, on chronic dialysis (N18.6)  Essential hypertension (I10)  Gastroesophageal reflux disease without esophagitis (K21.9)  ESRD on dialysis (N18.6)  Chest pain, unspecified type (R07.9)    HPI:  57 yo lady with a pmhx of HTN, ESRDon HD (MWF); who presents to the ED with chest pain and sob. Started on Friday, has been constant.   Did not get dialysis yesterday because of the chest pain, last time was Friday.   No cough, no fevers, no dysuria.  Has had hx of chest pain in the past with negative stress test.  Associated with shortness of breath while walking  and nausea  ECG:  NSR 82 bpm with sinus arrhythmia  Elvin mildly positive x 3    REVIEW OF SYSTEMS:    CONSTITUTIONAL: No fever, weight loss, or fatigue  EYES: No eye pain, visual disturbances, or discharge  ENMT:  No difficulty hearing, tinnitus, vertigo; No sinus or throat pain  NECK: No pain or stiffness  BREASTS: No pain, masses, or nipple discharge  RESPIRATORY: No cough, wheezing, chills or hemoptysis; + shortness of breath  CARDIOVASCULAR: +chest pain; no palpitations, dizziness, or leg swelling  GASTROINTESTINAL: No abdominal or epigastric pain. No nausea, vomiting, or hematemesis; No diarrhea or constipation. No melena or hematochezia.  GENITOURINARY: No dysuria, frequency, hematuria, or incontinence  NEUROLOGICAL: No headaches, memory loss, loss of strength, numbness, or tremors  SKIN: No itching, burning, rashes, or lesions   LYMPH NODES: No enlarged glands  ENDOCRINE: No heat or cold intolerance; No hair loss  MUSCULOSKELETAL: No joint pain or swelling; No muscle, back, or extremity pain  PSYCHIATRIC: No depression, anxiety, mood swings, or difficulty sleeping  HEME/LYMPH: No easy bruising, or bleeding gums  ALLERGY AND IMMUNOLOGIC: No hives or eczema    MEDICATIONS  (STANDING):  amLODIPine   Tablet 10 milliGRAM(s) Oral daily  calcium acetate 1334 milliGRAM(s) Oral four times a day with meals  influenza   Vaccine 0.5 milliLiter(s) IntraMuscular once  labetalol 600 milliGRAM(s) Oral every 8 hours  lisinopril 40 milliGRAM(s) Oral daily  nicotine - 21 mG/24Hr(s) Patch 1 patch Transdermal daily  pantoprazole    Tablet 40 milliGRAM(s) Oral before breakfast    MEDICATIONS  (PRN):      ALLERGIES: No Known Allergies      FAMILY HISTORY:  Family history of acute myocardial infarction (Father)  Family history of diabetes mellitus (DM) (Father, Mother)  Family history of essential hypertension (Father, Mother)      PHYSICAL EXAMINATION:  -----------------------------  T(C): 36.2 (09-17-19 @ 11:34), Max: 36.9 (09-16-19 @ 17:55)  HR: 63 (09-17-19 @ 11:34) (59 - 82)  BP: 147/73 (09-17-19 @ 11:34) (139/71 - 232/123)  RR: 16 (09-17-19 @ 11:34) (16 - 22)  SpO2: 98% (09-17-19 @ 11:34) (92% - 99%)  Wt(kg): --    09-16 @ 07:01  -  09-17 @ 07:00  --------------------------------------------------------  IN:  Total IN: 0 mL    OUT:    Other: 2500 mL  Total OUT: 2500 mL    Total NET: -2500 mL        Height (cm): 157.5 (09-16 @ 22:15)  Weight (kg): 60.3 (09-16 @ 22:15)  BMI (kg/m2): 24.3 (09-16 @ 22:15)  BSA (m2): 1.61 (09-16 @ 22:15)    Constitutional: well developed, normal appearance, well groomed, well nourished, no deformities and no acute distress.   Eyes: the conjunctiva exhibited no abnormalities and the eyelids demonstrated no xanthelasmas.   HEENT: normal oral mucosa, no oral pallor and no oral cyanosis.   Neck: normal jugular venous A waves present, normal jugular venous V waves present and no jugular venous ochoa A waves.   Pulmonary: no respiratory distress, normal respiratory rhythm and effort, no accessory muscle use and lungs were clear to auscultation bilaterally.   Cardiovascular: heart rate and rhythm were normal, normal S1 and S2 and no murmur, gallop, rub, heave or thrill are present.   Abdomen: soft, non-tender, no hepato-splenomegaly and no abdominal mass palpated.   Musculoskeletal: the gait could not be assessed..   Extremities: no clubbing of the fingernails, no localized cyanosis, no petechial hemorrhages and no ischemic changes.   Skin: normal skin color and pigmentation, no rash, no venous stasis, no skin lesions, no skin ulcer and no xanthoma was observed.   Psychiatric: oriented to person, place, and time, the affect was normal, the mood was normal and not feeling anxious.     LABS:   --------  09-17    137  |  98  |  26<H>  ----------------------------<  94  4.1   |  29  |  5.68<H>    Ca    8.7      17 Sep 2019 06:31    TPro  7.2  /  Alb  3.1<L>  /  TBili  0.7  /  DBili  x   /  AST  30  /  ALT  10<L>  /  AlkPhos  204<H>  09-16                         10.3   5.21  )-----------( 184      ( 17 Sep 2019 06:31 )             30.7     PT/INR - ( 16 Sep 2019 12:57 )   PT: 11.9 sec;   INR: 1.06 ratio         PTT - ( 16 Sep 2019 12:57 )  PTT:35.3 sec  09-16 @ 12:57 BNP: [id number] pg/mL    09-17 @ 06:31 CPK total:--, CKMB --, Troponin I - .045 ng/mL  09-16 @ 20:44 CPK total:--, CKMB --, Troponin I - .052 ng/mL<H>  09-16 @ 12:57 CPK total:--, CKMB --, Troponin I - .051 ng/mL<H>          RADIOLOGY:  -----------------    ECG:  NSR 82bpm with sinus arrhythmia    < from: TTE Echo Doppler w/o Cont (05.11.17 @ 09:56) >   Summary:   1. Left ventricular ejection fraction, by visual estimation, is 60 to   65%.   2. Technically fair study.   3. Normal global left ventricular systolic function.   4. Normal left ventricular internal cavity size.   5. Spectral Doppler shows impaired relaxation pattern of left   ventricular myocardial filling (Grade I diastolic dysfunction).   6. There is mild concentric left ventricular hypertrophy.   7. Normal right ventricular size and function.   8. Mild mitral valve regurgitation.   9. Thickening and calcification of the anterior and posterior mitral   valve leaflets.  10. Sclerotic aortic valve with normal opening.  11. Estimated pulmonary artery systolic pressure is 36.7 mmHg assuming a   right atrial pressure of 5 mmHg, which is consistent with borderline   pulmonary hypertension.      < end of copied text >

## 2019-09-17 NOTE — PROGRESS NOTE ADULT - SUBJECTIVE AND OBJECTIVE BOX
Patient is a 58y old  Female who presents with a chief complaint of chest pain (17 Sep 2019 12:39)      OVERNIGHT EVENTS:  none     REVIEW OF SYSTEMS: denies chest pain/SOB, diaphoresis, no F/C, cough, dizziness, headache, blurry vision, nausea, vomiting, abdominal pain. All others review of systems negative     MEDICATIONS  (STANDING):  amLODIPine   Tablet 10 milliGRAM(s) Oral daily  aspirin enteric coated 81 milliGRAM(s) Oral daily  atorvastatin 10 milliGRAM(s) Oral at bedtime  calcium acetate 1334 milliGRAM(s) Oral four times a day with meals  influenza   Vaccine 0.5 milliLiter(s) IntraMuscular once  labetalol 600 milliGRAM(s) Oral every 8 hours  lisinopril 40 milliGRAM(s) Oral daily  nicotine - 21 mG/24Hr(s) Patch 1 patch Transdermal daily  pantoprazole    Tablet 40 milliGRAM(s) Oral before breakfast    MEDICATIONS  (PRN):      Allergies    No Known Allergies    Intolerances        T(F): 97.1 (09-17-19 @ 11:34), Max: 98.4 (09-16-19 @ 17:55)  HR: 63 (09-17-19 @ 11:34) (59 - 82)  BP: 147/73 (09-17-19 @ 11:34) (139/71 - 232/123)  RR: 16 (09-17-19 @ 11:34) (16 - 22)  SpO2: 98% (09-17-19 @ 11:34) (92% - 99%)  Wt(kg): --    PHYSICAL EXAM:  GENERAL: NAD, well-groomed, well-developed  HEAD:  Atraumatic, Normocephalic  EYES: EOMI, PERRLA, conjunctiva and sclera clear  ENMT: No tonsillar erythema, exudates, or enlargement; Moist mucous membranes, Good dentition, No lesions  NECK: Supple, No JVD, Normal thyroid  NERVOUS SYSTEM:  Alert & Oriented X3, Good concentration; Motor Strength 5/5 B/L upper and lower extremities; DTRs 2+ intact and symmetric  CHEST/LUNG: Clear to percussion bilaterally; No rales, rhonchi, wheezing, or rubs BL  HEART: Regular rate and rhythm; No murmurs, rubs, or gallops  ABDOMEN: Soft, Nontender, Nondistended; Bowel sounds present  EXTREMITIES:  2+ Peripheral Pulses, No clubbing, cyanosis, or edema BL LE  LYMPH: No lymphadenopathy noted  SKIN: No rashes or lesions    LABS:                        10.3   5.21  )-----------( 184      ( 17 Sep 2019 06:31 )             30.7     09-17    137  |  98  |  26<H>  ----------------------------<  94  4.1   |  29  |  5.68<H>    Ca    8.7      17 Sep 2019 06:31    TPro  7.2  /  Alb  3.1<L>  /  TBili  0.7  /  DBili  x   /  AST  30  /  ALT  10<L>  /  AlkPhos  204<H>  09-16    PT/INR - ( 16 Sep 2019 12:57 )   PT: 11.9 sec;   INR: 1.06 ratio         PTT - ( 16 Sep 2019 12:57 )  PTT:35.3 sec    Cultures;   CAPILLARY BLOOD GLUCOSE        Lipid panel:     CARDIAC MARKERS ( 17 Sep 2019 12:45 )  .038 ng/mL / x     / x     / x     / x      CARDIAC MARKERS ( 17 Sep 2019 06:31 )  .045 ng/mL / x     / x     / x     / x      CARDIAC MARKERS ( 16 Sep 2019 20:44 )  .052 ng/mL / x     / x     / x     / x      CARDIAC MARKERS ( 16 Sep 2019 12:57 )  .051 ng/mL / x     / x     / x     / x            RADIOLOGY & ADDITIONAL TESTS:    Imaging Personally Reviewed:  [ x] YES    < from: TTE Echo Doppler w/o Cont (05.11.17 @ 09:56) >  1. Left ventricular ejection fraction, by visual estimation, is 60 to   65%.   2. Technically fair study.   3. Normal global left ventricular systolic function.   4. Normal left ventricular internal cavity size.   5. Spectral Doppler shows impaired relaxation pattern of left   ventricular myocardial filling (Grade I diastolic dysfunction).   6. There is mild concentric left ventricular hypertrophy.   7. Normal right ventricular size and function.   8. Mild mitral valve regurgitation.   9. Thickening and calcification of the anterior and posterior mitral   valve leaflets.  10. Sclerotic aortic valve with normal opening.  11. Estimated pulmonary artery systolic pressure is 36.7 mmHg assuming a   right atrial pressure of 5 mmHg, which is consistent with borderline   pulmonary hypertension.      Consultant(s) Notes Reviewed:  [x ] YES     Care Discussed with [x ] Consultants [X ] Patient [ ] Family  [x ]    [x ]  Other; RN

## 2019-09-17 NOTE — CONSULT NOTE ADULT - ASSESSMENT
57 yo lady with a pmhx of HTN, ESRDon HD (MWF); who presents to the ED with chest pain and sob. Started on Friday, has been constant.   Did not get dialysis yesterday because of the chest pain, last time was Friday.   No cough, no fevers, no dysuria.  Has had hx of chest pain in the past with negative stress test.  Associated with shortness of breath while walking  and nausea  ECG:  NSR 82 bpm with sinus arrhythmia  Elvin mildly positive x 3    -monitor on tele  -cont labetalol and lisinopril  -start daily asa and statin  -2D echo  -for likely cath in AM; NSTEMI/multiple risk factors/persistent intermittent chest pressure
Pt is a 59 yo lady with a pmhx of HTN, ESRDon HD, MWF, p/w chest pain, with hypertensive urgency, SBP in the 230's, with bnp >120,000, requiring immediate HD.  Patient is otherwise hemodynamically stable, denies sob, nausea/vomiting, or chest pain, when evaluated.  Patient needs immediate HD, which can be done on the floor,   patient does not need icu care unless HD cannot performed on the floor.  resume home bp medication once able to tolerate PO.

## 2019-09-17 NOTE — PROGRESS NOTE ADULT - SUBJECTIVE AND OBJECTIVE BOX
Patient feels well no complaints today.  No sob, blood pressure improved;       MEDICATIONS  (STANDING):  amLODIPine   Tablet 10 milliGRAM(s) Oral daily  aspirin enteric coated 81 milliGRAM(s) Oral daily  atorvastatin 10 milliGRAM(s) Oral at bedtime  calcium acetate 1334 milliGRAM(s) Oral four times a day with meals  heparin  Injectable 5000 Unit(s) SubCutaneous every 8 hours  influenza   Vaccine 0.5 milliLiter(s) IntraMuscular once  labetalol 600 milliGRAM(s) Oral every 8 hours  lisinopril 40 milliGRAM(s) Oral daily  nicotine - 21 mG/24Hr(s) Patch 1 patch Transdermal daily  pantoprazole    Tablet 40 milliGRAM(s) Oral before breakfast    MEDICATIONS  (PRN):      09-16-19 @ 07:01  -  09-17-19 @ 07:00  --------------------------------------------------------  IN: 0 mL / OUT: 2500 mL / NET: -2500 mL      PHYSICAL EXAM:      T(C): 36.2 (09-17-19 @ 11:34), Max: 36.9 (09-16-19 @ 17:55)  HR: 63 (09-17-19 @ 11:34) (59 - 82)  BP: 147/73 (09-17-19 @ 11:34) (139/71 - 232/123)  RR: 16 (09-17-19 @ 11:34) (16 - 22)  SpO2: 98% (09-17-19 @ 11:34) (92% - 99%)  Wt(kg): --  Respiratory: clear anteriorly, decreased BS at bases  Cardiovascular: S1 S2  Gastrointestinal: soft NT ND +BS  Extremities:   tr edema  + AVF                                    10.3   5.21  )-----------( 184      ( 17 Sep 2019 06:31 )             30.7     09-17    137  |  98  |  26<H>  ----------------------------<  94  4.1   |  29  |  5.68<H>    Ca    8.7      17 Sep 2019 06:31    TPro  7.2  /  Alb  3.1<L>  /  TBili  0.7  /  DBili  x   /  AST  30  /  ALT  10<L>  /  AlkPhos  204<H>  09-16      LIVER FUNCTIONS - ( 16 Sep 2019 12:57 )  Alb: 3.1 g/dL / Pro: 7.2 gm/dL / ALK PHOS: 204 U/L / ALT: 10 U/L / AST: 30 U/L / GGT: x           Creatinine Trend: 5.68<--, 10.10<--  Assessment and Plan:    ESRD; HTN crisis; improved;   HD for tomorrow;   Blood pressure acceptable;   Will follow course.

## 2019-09-17 NOTE — PROGRESS NOTE ADULT - ASSESSMENT
56 y/o hypertensive woman with ESRD on HD (MW) presenting with chest pain; Started on Friday, has been constant. Did not get dialysis yesterday because of the chest pain, last time was Friday. Has had hx of chest pain in the past with negative stress test. ECG:  NSR 82 bpm with sinus arrhythmia. Elvin mildly positive x 3. received aspirin

## 2019-09-17 NOTE — PROGRESS NOTE ADULT - PROBLEM SELECTOR PLAN 1
telemetry, ASA, beta blocker, nitrates  for likely cath in AM; NSTEMI/multiple risk factors/persistent intermittent chest pressure  2D echo

## 2019-09-18 ENCOUNTER — TRANSCRIPTION ENCOUNTER (OUTPATIENT)
Age: 59
End: 2019-09-18

## 2019-09-18 DIAGNOSIS — N18.6 END STAGE RENAL DISEASE: ICD-10-CM

## 2019-09-18 DIAGNOSIS — F17.200 NICOTINE DEPENDENCE, UNSPECIFIED, UNCOMPLICATED: ICD-10-CM

## 2019-09-18 LAB
ANION GAP SERPL CALC-SCNC: 10 MMOL/L — SIGNIFICANT CHANGE UP (ref 5–17)
BUN SERPL-MCNC: 55 MG/DL — HIGH (ref 7–23)
CALCIUM SERPL-MCNC: 8.3 MG/DL — LOW (ref 8.5–10.1)
CHLORIDE SERPL-SCNC: 100 MMOL/L — SIGNIFICANT CHANGE UP (ref 96–108)
CO2 SERPL-SCNC: 27 MMOL/L — SIGNIFICANT CHANGE UP (ref 22–31)
CREAT SERPL-MCNC: 10.5 MG/DL — HIGH (ref 0.5–1.3)
GLUCOSE SERPL-MCNC: 80 MG/DL — SIGNIFICANT CHANGE UP (ref 70–99)
HCT VFR BLD CALC: 29.1 % — LOW (ref 34.5–45)
HGB BLD-MCNC: 9.6 G/DL — LOW (ref 11.5–15.5)
MCHC RBC-ENTMCNC: 25.1 PG — LOW (ref 27–34)
MCHC RBC-ENTMCNC: 33 GM/DL — SIGNIFICANT CHANGE UP (ref 32–36)
MCV RBC AUTO: 76.2 FL — LOW (ref 80–100)
NRBC # BLD: 0 /100 WBCS — SIGNIFICANT CHANGE UP (ref 0–0)
PLATELET # BLD AUTO: 170 K/UL — SIGNIFICANT CHANGE UP (ref 150–400)
POTASSIUM SERPL-MCNC: 4.9 MMOL/L — SIGNIFICANT CHANGE UP (ref 3.5–5.3)
POTASSIUM SERPL-SCNC: 4.9 MMOL/L — SIGNIFICANT CHANGE UP (ref 3.5–5.3)
RBC # BLD: 3.82 M/UL — SIGNIFICANT CHANGE UP (ref 3.8–5.2)
RBC # FLD: 19.9 % — HIGH (ref 10.3–14.5)
SODIUM SERPL-SCNC: 137 MMOL/L — SIGNIFICANT CHANGE UP (ref 135–145)
WBC # BLD: 4.46 K/UL — SIGNIFICANT CHANGE UP (ref 3.8–10.5)
WBC # FLD AUTO: 4.46 K/UL — SIGNIFICANT CHANGE UP (ref 3.8–10.5)

## 2019-09-18 PROCEDURE — 99232 SBSQ HOSP IP/OBS MODERATE 35: CPT

## 2019-09-18 PROCEDURE — 99233 SBSQ HOSP IP/OBS HIGH 50: CPT

## 2019-09-18 RX ORDER — ATORVASTATIN CALCIUM 80 MG/1
1 TABLET, FILM COATED ORAL
Qty: 0 | Refills: 0 | DISCHARGE
Start: 2019-09-18

## 2019-09-18 RX ORDER — HEPARIN SODIUM 5000 [USP'U]/ML
5000 INJECTION INTRAVENOUS; SUBCUTANEOUS EVERY 8 HOURS
Refills: 0 | Status: COMPLETED | OUTPATIENT
Start: 2019-09-18 | End: 2019-09-18

## 2019-09-18 RX ORDER — NICOTINE POLACRILEX 2 MG
0 GUM BUCCAL
Qty: 0 | Refills: 0 | DISCHARGE
Start: 2019-09-18

## 2019-09-18 RX ORDER — ASPIRIN/CALCIUM CARB/MAGNESIUM 324 MG
1 TABLET ORAL
Qty: 0 | Refills: 0 | DISCHARGE
Start: 2019-09-18

## 2019-09-18 RX ORDER — CALCIUM ACETATE 667 MG
0 TABLET ORAL
Qty: 0 | Refills: 0 | DISCHARGE

## 2019-09-18 RX ORDER — CALCIUM ACETATE 667 MG
1334 TABLET ORAL
Qty: 0 | Refills: 0 | DISCHARGE
Start: 2019-09-18

## 2019-09-18 RX ORDER — NICOTINE POLACRILEX 2 MG
1 GUM BUCCAL
Qty: 0 | Refills: 0 | DISCHARGE
Start: 2019-09-18

## 2019-09-18 RX ORDER — PANTOPRAZOLE SODIUM 20 MG/1
1 TABLET, DELAYED RELEASE ORAL
Qty: 0 | Refills: 0 | DISCHARGE
Start: 2019-09-18

## 2019-09-18 RX ADMIN — Medication 1334 MILLIGRAM(S): at 08:30

## 2019-09-18 RX ADMIN — Medication 1334 MILLIGRAM(S): at 22:05

## 2019-09-18 RX ADMIN — LISINOPRIL 40 MILLIGRAM(S): 2.5 TABLET ORAL at 05:45

## 2019-09-18 RX ADMIN — Medication 81 MILLIGRAM(S): at 11:48

## 2019-09-18 RX ADMIN — PANTOPRAZOLE SODIUM 40 MILLIGRAM(S): 20 TABLET, DELAYED RELEASE ORAL at 05:45

## 2019-09-18 RX ADMIN — Medication 600 MILLIGRAM(S): at 22:05

## 2019-09-18 RX ADMIN — AMLODIPINE BESYLATE 10 MILLIGRAM(S): 2.5 TABLET ORAL at 05:45

## 2019-09-18 RX ADMIN — Medication 1334 MILLIGRAM(S): at 17:15

## 2019-09-18 RX ADMIN — Medication 1 PATCH: at 05:44

## 2019-09-18 RX ADMIN — Medication 1 PATCH: at 11:45

## 2019-09-18 RX ADMIN — Medication 1 PATCH: at 21:57

## 2019-09-18 RX ADMIN — Medication 1 PATCH: at 11:48

## 2019-09-18 RX ADMIN — Medication 1334 MILLIGRAM(S): at 11:48

## 2019-09-18 RX ADMIN — Medication 600 MILLIGRAM(S): at 05:45

## 2019-09-18 RX ADMIN — Medication 1 PATCH: at 07:00

## 2019-09-18 RX ADMIN — HEPARIN SODIUM 5000 UNIT(S): 5000 INJECTION INTRAVENOUS; SUBCUTANEOUS at 05:45

## 2019-09-18 RX ADMIN — HEPARIN SODIUM 5000 UNIT(S): 5000 INJECTION INTRAVENOUS; SUBCUTANEOUS at 22:05

## 2019-09-18 RX ADMIN — ATORVASTATIN CALCIUM 10 MILLIGRAM(S): 80 TABLET, FILM COATED ORAL at 22:05

## 2019-09-18 NOTE — DISCHARGE NOTE PROVIDER - NSDCCPCAREPLAN_GEN_ALL_CORE_FT
PRINCIPAL DISCHARGE DIAGNOSIS  Diagnosis: Chest pain, unspecified type  Assessment and Plan of Treatment:       SECONDARY DISCHARGE DIAGNOSES  Diagnosis: Fluid overload  Assessment and Plan of Treatment: PRINCIPAL DISCHARGE DIAGNOSIS  Diagnosis: Chest pain, unspecified type  Assessment and Plan of Treatment:       SECONDARY DISCHARGE DIAGNOSES  Diagnosis: Gastroesophageal reflux disease without esophagitis  Assessment and Plan of Treatment: Gastroesophageal reflux disease without esophagitis    Diagnosis: Anemia due to chronic kidney disease, on chronic dialysis  Assessment and Plan of Treatment: Anemia due to chronic kidney disease, on chronic dialysis    Diagnosis: Essential hypertension  Assessment and Plan of Treatment: Essential hypertension    Diagnosis: Tobacco use disorder  Assessment and Plan of Treatment:     Diagnosis: Fluid overload  Assessment and Plan of Treatment: PRINCIPAL DISCHARGE DIAGNOSIS  Diagnosis: NSTEMI (non-ST elevated myocardial infarction)  Assessment and Plan of Treatment: tx to NSU for cath      SECONDARY DISCHARGE DIAGNOSES  Diagnosis: Gastroesophageal reflux disease without esophagitis  Assessment and Plan of Treatment:     Diagnosis: Essential hypertension  Assessment and Plan of Treatment:     Diagnosis: Anemia due to chronic kidney disease, on chronic dialysis  Assessment and Plan of Treatment:     Diagnosis: Tobacco use disorder  Assessment and Plan of Treatment:     Diagnosis: Fluid overload  Assessment and Plan of Treatment:

## 2019-09-18 NOTE — DISCHARGE NOTE PROVIDER - CARE PROVIDER_API CALL
Sujatha Sanchez)  Internal Medicine  [facility] [place] [address]  Phone: [phone]  Fax: [phone]  Follow Up Time:     Maurice Garcia)  Internal Medicine  [address] [facility], [facility] [address]  [place] [address]  Phone: [phone]  Fax: [phone]  Follow Up Time: Sujatha Sanchez)  Internal Medicine  [facility] [place] [id number]  Phone: [phone]  Fax: [phone]  Follow Up Time:     Maurice Garcia)  Internal Medicine  [address] [facility], [facility] [address]  [place] [address]  Phone: [phone]  Fax: [phone]  Follow Up Time:     Colin Gonzalez)  Cardiology; Interventional Cardiology  [address] [place] [id number]  Phone: [phone]  Fax: [phone]  Follow Up Time:

## 2019-09-18 NOTE — DISCHARGE NOTE PROVIDER - PROVIDER TOKENS
PROVIDER:[TOKEN:[[id number]]],PROVIDER:[TOKEN:[[id number]]] PROVIDER:[TOKEN:[[id number]]],PROVIDER:[TOKEN:[[id number]]],PROVIDER:[TOKEN:[[id number]]]

## 2019-09-18 NOTE — DISCHARGE NOTE PROVIDER - NSDCCPGOAL_GEN_ALL_CORE_FT
03/19/17 1027   PT Discharge Summary   Reason for Discharge Discharge from facility      To get better and follow your care plan as instructed.

## 2019-09-18 NOTE — PROGRESS NOTE ADULT - SUBJECTIVE AND OBJECTIVE BOX
Patient is a 58y old  Female who presents with a chief complaint of chest pain (17 Sep 2019 14:32)    PAST MEDICAL & SURGICAL HISTORY:  GERD (gastroesophageal reflux disease)  ESRD on dialysis  HTN (hypertension)  Fibroid, uterine  History of bilateral breast reduction surgery    INTERVAL HISTORY:  	  MEDICATIONS:  MEDICATIONS  (STANDING):  amLODIPine   Tablet 10 milliGRAM(s) Oral daily  aspirin enteric coated 81 milliGRAM(s) Oral daily  atorvastatin 10 milliGRAM(s) Oral at bedtime  calcium acetate 1334 milliGRAM(s) Oral four times a day with meals  heparin  Injectable 5000 Unit(s) SubCutaneous every 8 hours  influenza   Vaccine 0.5 milliLiter(s) IntraMuscular once  labetalol 600 milliGRAM(s) Oral every 8 hours  lisinopril 40 milliGRAM(s) Oral daily  nicotine - 21 mG/24Hr(s) Patch 1 patch Transdermal daily  pantoprazole    Tablet 40 milliGRAM(s) Oral before breakfast    MEDICATIONS  (PRN):      Vitals:  T(F): 96.6 (09-18-19 @ 12:01), Max: 98.8 (09-17-19 @ 16:55)  HR: 60 (09-18-19 @ 12:01) (60 - 85)  BP: 146/74 (09-18-19 @ 12:01) (141/77 - 173/-)  RR: 18 (09-18-19 @ 12:01) (16 - 18)  SpO2: 97% (09-18-19 @ 12:01) (97% - 99%)  Wt(kg): --    09-17 @ 07:01  -  09-18 @ 07:00  --------------------------------------------------------  IN:  Total IN: 0 mL    OUT:  Total OUT: 0 mL    Total NET: 0 mL    09-18 @ 07:01  -  09-18 @ 14:21  --------------------------------------------------------  IN:    Oral Fluid: 240 mL  Total IN: 240 mL    OUT:  Total OUT: 0 mL    Total NET: 240 mL    Weight (kg): 60.3 (09-16 @ 22:15)  BMI (kg/m2): 24.3 (09-16 @ 22:15)    PHYSICAL EXAM:  Neuro: Awake, responsive  CV: S1 S2 RRR  Lungs: CTABL  GI: Soft, BS +, ND, NT  Extremities: No edema    TELEMETRY: RSR    RADIOLOGY: < from: Xray Chest 1 View AP/PA. (09.16.19 @ 13:37) >  Heart is significantly enlarged similar to May 8, 2017.    Mild congestion is noted increased from prior.    IMPRESSION: Cardiomegaly and CHF.    < end of copied text >    DIAGNOSTIC TESTING:    [x ] Echocardiogram:< from: TTE Echo Doppler w/o Cont (09.17.19 @ 12:57) >   1. Left ventricular ejection fraction, by visual estimation, is 55 to   60%.   2. Normal global left ventricular systolic function.   3. Moderately increased LV wall thickness.   4. Normal left ventricular internal cavity size.   5. Spectral Doppler shows impaired relaxation pattern of left   ventricular myocardial filling (Grade I diastolic dysfunction).   6. There is moderate concentric left ventricular hypertrophy.   7. Normal right ventricular size and function.   8. The right atrium is normal in size.   9. Small pericardial effusion.  10. Mild mitral annular calcification.  11. Mild mitral valve regurgitation.  12. Thickening of the anterior and posterior mitral valve leaflets.  13. Structurally normal tricuspid valve, with normal leaflet excursion.  14. Structurally normal pulmonic valve, with normal leaflet excursion.  15. Peak transaortic gradient equals 22.0 mmHg, mean transaortic gradient   equals 9.6 mmHg, the calculated aortic valve area equals 2.17 cm² by the   continuity equation consistent with mild aortic stenosis.    < end of copied text >      LABS:	 	    CARDIAC MARKERS:  Troponin I, Serum: .038 ng/mL (09-17 @ 12:45)  Troponin I, Serum: .045 ng/mL (09-17 @ 06:31)  Troponin I, Serum: .052 ng/mL (09-16 @ 20:44)  Troponin I, Serum: .051 ng/mL (09-16 @ 12:57)      17 Sep 2019 06:31    137    |  98     |  26     ----------------------------<  94     4.1     |  29     |  5.68   16 Sep 2019 12:57    141    |  102    |  67     ----------------------------<  104    4.8     |  26     |  10.10    Ca    8.7        17 Sep 2019 06:31                        10.3   5.21  )-----------( 184      ( 17 Sep 2019 06:31 )             30.7 ,                       11.1   5.65  )-----------( 169      ( 16 Sep 2019 12:57 )             33.2   proBNP: Serum Pro-Brain Natriuretic Peptide: 469131 pg/mL (09-16 @ 12:57)      INR: 1.06 ratio (09-16 @ 12:57) Patient is a 58y old  Female who presents with a chief complaint of chest pain (17 Sep 2019 14:32)    PAST MEDICAL & SURGICAL HISTORY:  GERD (gastroesophageal reflux disease)  ESRD on dialysis  HTN (hypertension)  Fibroid, uterine  History of bilateral breast reduction surgery    INTERVAL HISTORY: Resting in bed, not in any acute distress, denies any chest pain or sob   	  MEDICATIONS:  MEDICATIONS  (STANDING):  amLODIPine   Tablet 10 milliGRAM(s) Oral daily  aspirin enteric coated 81 milliGRAM(s) Oral daily  atorvastatin 10 milliGRAM(s) Oral at bedtime  calcium acetate 1334 milliGRAM(s) Oral four times a day with meals  heparin  Injectable 5000 Unit(s) SubCutaneous every 8 hours  influenza   Vaccine 0.5 milliLiter(s) IntraMuscular once  labetalol 600 milliGRAM(s) Oral every 8 hours  lisinopril 40 milliGRAM(s) Oral daily  nicotine - 21 mG/24Hr(s) Patch 1 patch Transdermal daily  pantoprazole    Tablet 40 milliGRAM(s) Oral before breakfast    MEDICATIONS  (PRN):      Vitals:  T(F): 96.6 (09-18-19 @ 12:01), Max: 98.8 (09-17-19 @ 16:55)  HR: 60 (09-18-19 @ 12:01) (60 - 85)  BP: 146/74 (09-18-19 @ 12:01) (141/77 - 173/-)  RR: 18 (09-18-19 @ 12:01) (16 - 18)  SpO2: 97% (09-18-19 @ 12:01) (97% - 99%)  Wt(kg): --61 kg    09-17 @ 07:01  -  09-18 @ 07:00  --------------------------------------------------------  IN:  Total IN: 0 mL    OUT:  Total OUT: 0 mL    Total NET: 0 mL    09-18 @ 07:01  -  09-18 @ 14:21  --------------------------------------------------------  IN:    Oral Fluid: 240 mL  Total IN: 240 mL    OUT:  Total OUT: 0 mL    Total NET: 240 mL    Weight (kg): 60.3 (09-16 @ 22:15)  BMI (kg/m2): 24.3 (09-16 @ 22:15)    PHYSICAL EXAM:  Neuro: Awake, responsive  CV: S1 S2 RRR, + SM  Lungs: CTABL  GI: Soft, BS +, ND, NT  Extremities: No edema    TELEMETRY: RSR    RADIOLOGY: < from: Xray Chest 1 View AP/PA. (09.16.19 @ 13:37) >  Heart is significantly enlarged similar to May 8, 2017.    Mild congestion is noted increased from prior.    IMPRESSION: Cardiomegaly and CHF.    < end of copied text >    DIAGNOSTIC TESTING:    [x ] Echocardiogram:< from: TTE Echo Doppler w/o Cont (09.17.19 @ 12:57) >   1. Left ventricular ejection fraction, by visual estimation, is 55 to   60%.   2. Normal global left ventricular systolic function.   3. Moderately increased LV wall thickness.   4. Normal left ventricular internal cavity size.   5. Spectral Doppler shows impaired relaxation pattern of left   ventricular myocardial filling (Grade I diastolic dysfunction).   6. There is moderate concentric left ventricular hypertrophy.   7. Normal right ventricular size and function.   8. The right atrium is normal in size.   9. Small pericardial effusion.  10. Mild mitral annular calcification.  11. Mild mitral valve regurgitation.  12. Thickening of the anterior and posterior mitral valve leaflets.  13. Structurally normal tricuspid valve, with normal leaflet excursion.  14. Structurally normal pulmonic valve, with normal leaflet excursion.  15. Peak transaortic gradient equals 22.0 mmHg, mean transaortic gradient   equals 9.6 mmHg, the calculated aortic valve area equals 2.17 cm² by the   continuity equation consistent with mild aortic stenosis.    < end of copied text >      LABS:	 	    CARDIAC MARKERS:  Troponin I, Serum: .038 ng/mL (09-17 @ 12:45)  Troponin I, Serum: .045 ng/mL (09-17 @ 06:31)  Troponin I, Serum: .052 ng/mL (09-16 @ 20:44)  Troponin I, Serum: .051 ng/mL (09-16 @ 12:57)      17 Sep 2019 06:31    137    |  98     |  26     ----------------------------<  94     4.1     |  29     |  5.68   16 Sep 2019 12:57    141    |  102    |  67     ----------------------------<  104    4.8     |  26     |  10.10    Ca    8.7        17 Sep 2019 06:31                        10.3   5.21  )-----------( 184      ( 17 Sep 2019 06:31 )             30.7 ,                       11.1   5.65  )-----------( 169      ( 16 Sep 2019 12:57 )             33.2   proBNP: Serum Pro-Brain Natriuretic Peptide: 730503 pg/mL (09-16 @ 12:57)      INR: 1.06 ratio (09-16 @ 12:57)

## 2019-09-18 NOTE — PROGRESS NOTE ADULT - ASSESSMENT
59 yo lady with a pmhx of HTN, ESRDon HD (MWF); who presents to the ED with chest pain and sob. Started on Friday, has been constant.   Missed dialysis because of the chest pain, last time was Friday.   No cough, no fevers, no dysuria.  Has had hx of chest pain in the past with negative stress test.  Associated with shortness of breath while walking  and nausea  ECG:  NSR 82 bpm with sinus arrhythmia  Trop mildly positive x 2    Plan  -monitor on tele  -cont labetalol and lisinopril  -cont daily asa and statin  -2D echo: Ef normal, Grade I DD, mild MR/AS  -Dialysis today, Renal following   -for transfer to [facility]  for cath in AM; NSTEMI/multiple risk factors/persistent intermittent chest pressure

## 2019-09-18 NOTE — PROGRESS NOTE ADULT - ASSESSMENT
58 y/o hypertensive woman with ESRD on HD (Formerly [facility]) presenting with chest pain; Started on Friday, has been constant. Did not get dialysis yesterday because of the chest pain, last time was Friday. Has had hx of chest pain in the past with negative stress test. ECG:  NSR 82 bpm with sinus arrhythmia. Elvin mildly positive x 3. received aspirin     Problem/Plan - 1:  ·  Problem: Unstable angina pectoris.  Plan: telemetry, ASA, beta blocker, nitrates  for likely cath in AM; NSTEMI/multiple risk factors/persistent intermittent chest pressure  2D echo.      Problem/Plan - 2:  ·  Problem: ESRD on dialysis.  Plan: Renal on board  on HD (Formerly [facility]).      Problem/Plan - 3:  ·  Problem: Essential hypertension.  Plan: continue BP medication.       GERD   - c/w     Porphylaxis:  DVT:  GI: 58 y/o hypertensive woman with history of ESRD on HD (MWF) who presented with chest pain        Problem/Plan - 1:  ·  Problem: Unstable angina pectoris.  Plan: telemetry, ASA, beta blocker, nitrates  for likely cath in AM; NSTEMI/multiple risk factors/persistent intermittent chest pressure  2D echo.      Problem/Plan - 2:  ·  Problem: ESRD on dialysis.  Plan: Renal on board  on HD ([facility]).      Problem/Plan - 3:  ·  Problem: Essential hypertension.  Plan: continue BP medication.       GERD   - c/w     Porphylaxis:  DVT:  GI: 58 y/o hypertensive woman with history of ESRD on HD (MWF) who presented with chest pain and was found to have NSTEMI.    NSTEMI  - trop peaked at 0.052  - pending transfer for cath    Unstable angina pectoris.  Plan: telemetry, ASA, beta blocker, nitrates  for likely cath in AM; NSTEMI/multiple risk factors/persistent intermittent chest pressure  2D echo.       ESRD on dialysis  - Renal following  on HD (MWF)      Essential hypertension      GERD   - c/w     Porphylaxis:  DVT:  GI: 58 y/o hypertensive woman with history of ESRD on HD (MWF) who presented with chest pain and was found to have NSTEMI.    NSTEMI  - trop peaked at 0.052  - pending transfer for cath  - c/w ASA, Lipitor and Labetalol  - Echo    ESRD on dialysis  - Renal following  - c/w HD (MWF)    Essential hypertension  - c/w lisinopril, Norvasc and Labetalol    GERD   - c/w protonix    Prophylaxis:  DVT: heparin  GI: protonix 56 y/o hypertensive woman with history of ESRD on HD (MWF) who presented with chest pain and was found to have NSTEMI.    NSTEMI  - trop peaked at 0.052  - pending transfer for cath tomorrow  - c/w ASA, Lipitor and Labetalol  - Echo showed EF 55-60% w/ grade I diastolic dysfunction     ESRD on dialysis  - Renal following  - c/w HD (MWF)    Essential hypertension  - c/w lisinopril, Norvasc and Labetalol    GERD   - c/w Protonix    Prophylaxis:  DVT: heparin  GI: Protonix

## 2019-09-18 NOTE — PROGRESS NOTE ADULT - SUBJECTIVE AND OBJECTIVE BOX
Patient seen and examined  no complaints    No Known Allergies    Hospital Medications:   MEDICATIONS  (STANDING):  amLODIPine   Tablet 10 milliGRAM(s) Oral daily  aspirin enteric coated 81 milliGRAM(s) Oral daily  atorvastatin 10 milliGRAM(s) Oral at bedtime  calcium acetate 1334 milliGRAM(s) Oral four times a day with meals  heparin  Injectable 5000 Unit(s) SubCutaneous every 8 hours  influenza   Vaccine 0.5 milliLiter(s) IntraMuscular once  labetalol 600 milliGRAM(s) Oral every 8 hours  lisinopril 40 milliGRAM(s) Oral daily  nicotine - 21 mG/24Hr(s) Patch 1 patch Transdermal daily  pantoprazole    Tablet 40 milliGRAM(s) Oral before breakfast        VITALS:  T(F): 96.6 (09-18-19 @ 12:01), Max: 98.8 (09-17-19 @ 16:55)  HR: 60 (09-18-19 @ 12:01)  BP: 146/74 (09-18-19 @ 12:01)  RR: 18 (09-18-19 @ 12:01)  SpO2: 97% (09-18-19 @ 12:01)  Wt(kg): --    09-17 @ 07:01  -  09-18 @ 07:00  --------------------------------------------------------  IN: 0 mL / OUT: 0 mL / NET: 0 mL    09-18 @ 07:01  -  09-18 @ 15:01  --------------------------------------------------------  IN: 240 mL / OUT: 0 mL / NET: 240 mL        PHYSICAL EXAM:  Constitutional: NAD  HEENT: anicteric sclera, oropharynx clear, MMM  Neck: No JVD  Respiratory: CTAB, no wheezes, rales or rhonchi  Cardiovascular: S1, S2, RRR  Gastrointestinal: BS+, soft, NT/ND  Extremities: +peripheral edema  Vascular Access: left forearm avf +thrill    LABS:  09-17    137  |  98  |  26<H>  ----------------------------<  94  4.1   |  29  |  5.68<H>    Ca    8.7      17 Sep 2019 06:31      Creatinine Trend: 5.68 <--, 10.10 <--                        10.3   5.21  )-----------( 184      ( 17 Sep 2019 06:31 )             30.7     Urine Studies:      RADIOLOGY & ADDITIONAL STUDIES:

## 2019-09-18 NOTE — PROGRESS NOTE ADULT - SUBJECTIVE AND OBJECTIVE BOX
Patient is a 58y old  Female who presents with a chief complaint of chest pain (18 Sep 2019 15:01)      INTERVAL HPI/OVERNIGHT EVENTS:    MEDICATIONS  (STANDING):  amLODIPine   Tablet 10 milliGRAM(s) Oral daily  aspirin enteric coated 81 milliGRAM(s) Oral daily  atorvastatin 10 milliGRAM(s) Oral at bedtime  calcium acetate 1334 milliGRAM(s) Oral four times a day with meals  heparin  Injectable 5000 Unit(s) SubCutaneous every 8 hours  influenza   Vaccine 0.5 milliLiter(s) IntraMuscular once  labetalol 600 milliGRAM(s) Oral every 8 hours  lisinopril 40 milliGRAM(s) Oral daily  nicotine - 21 mG/24Hr(s) Patch 1 patch Transdermal daily  pantoprazole    Tablet 40 milliGRAM(s) Oral before breakfast    MEDICATIONS  (PRN):      Allergies    No Known Allergies    Intolerances        Vital Signs Last 24 Hrs  T(C): 35.9 (18 Sep 2019 12:01), Max: 36.6 (17 Sep 2019 23:48)  T(F): 96.6 (18 Sep 2019 12:01), Max: 97.8 (17 Sep 2019 23:48)  HR: 60 (18 Sep 2019 12:01) (60 - 70)  BP: 146/74 (18 Sep 2019 12:01) (141/77 - 173/-)  BP(mean): --  RR: 18 (18 Sep 2019 12:01) (16 - 18)  SpO2: 97% (18 Sep 2019 12:01) (97% - 99%)    PHYSICAL EXAM:  GENERAL: NAD, well-groomed, well-developed  HEAD:  Atraumatic, Normocephalic  EYES: EOMI, PERRLA, conjunctiva and sclera clear  ENMT: No tonsillar erythema, exudates, or enlargement; Moist mucous membranes, Good dentition, No lesions  NECK: Supple, No JVD, Normal thyroid  NERVOUS SYSTEM:  Alert & Oriented X3, Good concentration; Motor Strength 5/5 B/L upper and lower extremities; DTRs 2+ intact and symmetric  CHEST/LUNG: Clear to auscultation bilaterally; No rales, rhonchi, wheezing, or rubs  HEART: Regular rate and rhythm; No murmurs, rubs, or gallops  ABDOMEN: Soft, Nontender, Nondistended; Bowel sounds present  EXTREMITIES:  2+ Peripheral Pulses, No clubbing, cyanosis, or edema  LYMPH: No lymphadenopathy noted  SKIN: No rashes or lesions    LABS:                        10.3   5.21  )-----------( 184      ( 17 Sep 2019 06:31 )             30.7     09-17    137  |  98  |  26<H>  ----------------------------<  94  4.1   |  29  |  5.68<H>    Ca    8.7      17 Sep 2019 06:31     RADIOLOGY & ADDITIONAL TESTS:    09-17-19 @ 07:01  -  09-18-19 @ 07:00  --------------------------------------------------------  IN:  Total IN: 0 mL    OUT:  Total OUT: 0 mL    Total NET: 0 mL      09-18-19 @ 07:01  -  09-18-19 @ 17:33  --------------------------------------------------------  IN:    Oral Fluid: 240 mL  Total IN: 240 mL    OUT:  Total OUT: 0 mL    Total NET: 240 mL 56 y/o hypertensive woman with history of ESRD on HD (MWF) who presented with chest pain and was found to have NSTEMI. She is lying in bed in NAD.    MEDICATIONS  (STANDING):  amLODIPine   Tablet 10 milliGRAM(s) Oral daily  aspirin enteric coated 81 milliGRAM(s) Oral daily  atorvastatin 10 milliGRAM(s) Oral at bedtime  calcium acetate 1334 milliGRAM(s) Oral four times a day with meals  heparin  Injectable 5000 Unit(s) SubCutaneous every 8 hours  influenza   Vaccine 0.5 milliLiter(s) IntraMuscular once  labetalol 600 milliGRAM(s) Oral every 8 hours  lisinopril 40 milliGRAM(s) Oral daily  nicotine - 21 mG/24Hr(s) Patch 1 patch Transdermal daily  pantoprazole    Tablet 40 milliGRAM(s) Oral before breakfast    MEDICATIONS  (PRN):      Allergies    No Known Allergies    Intolerances        Vital Signs Last 24 Hrs  T(C): 35.9 (18 Sep 2019 12:01), Max: 36.6 (17 Sep 2019 23:48)  T(F): 96.6 (18 Sep 2019 12:01), Max: 97.8 (17 Sep 2019 23:48)  HR: 60 (18 Sep 2019 12:01) (60 - 70)  BP: 146/74 (18 Sep 2019 12:01) (141/77 - 173/-)  BP(mean): --  RR: 18 (18 Sep 2019 12:01) (16 - 18)  SpO2: 97% (18 Sep 2019 12:01) (97% - 99%)    PHYSICAL EXAM:  GENERAL: NAD, well-groomed, well-developed  HEAD:  Atraumatic, Normocephalic  EYES: EOMI, PERRLA   NECK: Supple   NERVOUS SYSTEM: Alert   CHEST/LUNG: Clear to auscultation bilaterally; No rales, rhonchi, wheezing, or rubs  HEART: Regular rate and rhythm; No murmurs, rubs, or gallops  ABDOMEN: Soft, Nontender, Nondistended; Bowel sounds present  EXTREMITIES: No clubbing, cyanosis, or edema      LABS:                        10.3   5.21  )-----------( 184      ( 17 Sep 2019 06:31 )             30.7     09-17    137  |  98  |  26<H>  ----------------------------<  94  4.1   |  29  |  5.68<H>    Ca    8.7      17 Sep 2019 06:31     RADIOLOGY & ADDITIONAL TESTS:    09-17-19 @ 07:01  -  09-18-19 @ 07:00  --------------------------------------------------------  IN:  Total IN: 0 mL    OUT:  Total OUT: 0 mL    Total NET: 0 mL      09-18-19 @ 07:01  -  09-18-19 @ 17:33  --------------------------------------------------------  IN:    Oral Fluid: 240 mL  Total IN: 240 mL    OUT:  Total OUT: 0 mL    Total NET: 240 mL

## 2019-09-18 NOTE — DISCHARGE NOTE PROVIDER - HOSPITAL COURSE
59 yo female pmh esrd on hd m/w/f .htn, hld,admit 9/16 with chest pain dyspnea on exertion , labs reveales troponin .051.,052 ,045 [id number]    cxr read as cardiomegaly and chf, tte 9/17 ef 55-60% grade 1 diastolic dysfunction ,mild aortic stenosis , pt was evaluated by cardiology and plan for cardiac catheterization . 59 yo female pmh esrd on hd m/w/f .htn, hld,admit 9/16 with chest pain dyspnea on exertion , labs reveales troponin .051.,052 ,045 [id number]    cxr read as cardiomegaly and chf, tte 9/17 ef 55-60% grade 1 diastolic dysfunction ,mild aortic stenosis , pt was evaluated by cardiology transfer to [facility]  for cardiac catheterization .

## 2019-09-18 NOTE — DISCHARGE NOTE PROVIDER - CARE PROVIDERS DIRECT ADDRESSES
,DirectAddress_Unknown,[email or web address] ,DirectAddress_Unknown,[email or web address]

## 2019-09-19 ENCOUNTER — INPATIENT (INPATIENT)
Facility: HOSPITAL | Age: 59
LOS: 0 days | Discharge: HOME | DRG: 280 | End: 2019-09-20
Attending: INTERNAL MEDICINE | Admitting: INTERNAL MEDICINE
Payer: MEDICARE

## 2019-09-19 VITALS
HEART RATE: 53 BPM | SYSTOLIC BLOOD PRESSURE: 168 MMHG | RESPIRATION RATE: 16 BRPM | HEIGHT: 62 IN | WEIGHT: 130.07 LBS | OXYGEN SATURATION: 100 % | TEMPERATURE: 98 F | DIASTOLIC BLOOD PRESSURE: 80 MMHG

## 2019-09-19 VITALS
TEMPERATURE: 97 F | SYSTOLIC BLOOD PRESSURE: 177 MMHG | RESPIRATION RATE: 17 BRPM | OXYGEN SATURATION: 96 % | HEART RATE: 79 BPM | DIASTOLIC BLOOD PRESSURE: 81 MMHG

## 2019-09-19 DIAGNOSIS — Z90.13 ACQUIRED ABSENCE OF BILATERAL BREASTS AND NIPPLES: Chronic | ICD-10-CM

## 2019-09-19 DIAGNOSIS — D25.9 LEIOMYOMA OF UTERUS, UNSPECIFIED: Chronic | ICD-10-CM

## 2019-09-19 DIAGNOSIS — I21.4 NON-ST ELEVATION (NSTEMI) MYOCARDIAL INFARCTION: ICD-10-CM

## 2019-09-19 DIAGNOSIS — I77.0 ARTERIOVENOUS FISTULA, ACQUIRED: Chronic | ICD-10-CM

## 2019-09-19 LAB
ANION GAP SERPL CALC-SCNC: 10 MMOL/L — SIGNIFICANT CHANGE UP (ref 5–17)
APTT BLD: 33.5 SEC — SIGNIFICANT CHANGE UP (ref 27.5–36.3)
BUN SERPL-MCNC: 28 MG/DL — HIGH (ref 7–23)
CALCIUM SERPL-MCNC: 9.6 MG/DL — SIGNIFICANT CHANGE UP (ref 8.5–10.1)
CHLORIDE SERPL-SCNC: 103 MMOL/L — SIGNIFICANT CHANGE UP (ref 96–108)
CO2 SERPL-SCNC: 30 MMOL/L — SIGNIFICANT CHANGE UP (ref 22–31)
CREAT SERPL-MCNC: 6.23 MG/DL — HIGH (ref 0.5–1.3)
GLUCOSE SERPL-MCNC: 79 MG/DL — SIGNIFICANT CHANGE UP (ref 70–99)
HCT VFR BLD CALC: 31.2 % — LOW (ref 34.5–45)
HGB BLD-MCNC: 10.3 G/DL — LOW (ref 11.5–15.5)
INR BLD: 1.05 RATIO — SIGNIFICANT CHANGE UP (ref 0.88–1.16)
MAGNESIUM SERPL-MCNC: 2 MG/DL — SIGNIFICANT CHANGE UP (ref 1.6–2.6)
MCHC RBC-ENTMCNC: 25.3 PG — LOW (ref 27–34)
MCHC RBC-ENTMCNC: 33 GM/DL — SIGNIFICANT CHANGE UP (ref 32–36)
MCV RBC AUTO: 76.7 FL — LOW (ref 80–100)
NRBC # BLD: 0 /100 WBCS — SIGNIFICANT CHANGE UP (ref 0–0)
PHOSPHATE SERPL-MCNC: 4.3 MG/DL — SIGNIFICANT CHANGE UP (ref 2.5–4.5)
PLATELET # BLD AUTO: 180 K/UL — SIGNIFICANT CHANGE UP (ref 150–400)
POTASSIUM SERPL-MCNC: 4.4 MMOL/L — SIGNIFICANT CHANGE UP (ref 3.5–5.3)
POTASSIUM SERPL-SCNC: 4.4 MMOL/L — SIGNIFICANT CHANGE UP (ref 3.5–5.3)
PROTHROM AB SERPL-ACNC: 11.8 SEC — SIGNIFICANT CHANGE UP (ref 10–12.9)
RBC # BLD: 4.07 M/UL — SIGNIFICANT CHANGE UP (ref 3.8–5.2)
RBC # FLD: 19.9 % — HIGH (ref 10.3–14.5)
SODIUM SERPL-SCNC: 143 MMOL/L — SIGNIFICANT CHANGE UP (ref 135–145)
WBC # BLD: 5.03 K/UL — SIGNIFICANT CHANGE UP (ref 3.8–10.5)
WBC # FLD AUTO: 5.03 K/UL — SIGNIFICANT CHANGE UP (ref 3.8–10.5)

## 2019-09-19 PROCEDURE — 99152 MOD SED SAME PHYS/QHP 5/>YRS: CPT

## 2019-09-19 PROCEDURE — 93454 CORONARY ARTERY ANGIO S&I: CPT | Mod: 26

## 2019-09-19 PROCEDURE — 99239 HOSP IP/OBS DSCHRG MGMT >30: CPT

## 2019-09-19 RX ORDER — ATORVASTATIN CALCIUM 80 MG/1
10 TABLET, FILM COATED ORAL AT BEDTIME
Refills: 0 | Status: DISCONTINUED | OUTPATIENT
Start: 2019-09-19 | End: 2019-09-20

## 2019-09-19 RX ORDER — LISINOPRIL 2.5 MG/1
40 TABLET ORAL DAILY
Refills: 0 | Status: DISCONTINUED | OUTPATIENT
Start: 2019-09-19 | End: 2019-09-20

## 2019-09-19 RX ORDER — AMLODIPINE BESYLATE 2.5 MG/1
10 TABLET ORAL DAILY
Refills: 0 | Status: DISCONTINUED | OUTPATIENT
Start: 2019-09-19 | End: 2019-09-20

## 2019-09-19 RX ORDER — SIMETHICONE 80 MG/1
80 TABLET, CHEWABLE ORAL THREE TIMES A DAY
Refills: 0 | Status: DISCONTINUED | OUTPATIENT
Start: 2019-09-19 | End: 2019-09-20

## 2019-09-19 RX ORDER — CALCIUM ACETATE 667 MG
1334 TABLET ORAL
Refills: 0 | Status: DISCONTINUED | OUTPATIENT
Start: 2019-09-19 | End: 2019-09-20

## 2019-09-19 RX ORDER — ASPIRIN/CALCIUM CARB/MAGNESIUM 324 MG
81 TABLET ORAL DAILY
Refills: 0 | Status: DISCONTINUED | OUTPATIENT
Start: 2019-09-19 | End: 2019-09-20

## 2019-09-19 RX ORDER — HYDRALAZINE HCL 50 MG
10 TABLET ORAL ONCE
Refills: 0 | Status: COMPLETED | OUTPATIENT
Start: 2019-09-19 | End: 2019-09-19

## 2019-09-19 RX ORDER — LABETALOL HCL 100 MG
600 TABLET ORAL THREE TIMES A DAY
Refills: 0 | Status: DISCONTINUED | OUTPATIENT
Start: 2019-09-19 | End: 2019-09-20

## 2019-09-19 RX ORDER — NICOTINE POLACRILEX 2 MG
1 GUM BUCCAL DAILY
Refills: 0 | Status: DISCONTINUED | OUTPATIENT
Start: 2019-09-19 | End: 2019-09-20

## 2019-09-19 RX ORDER — ERGOCALCIFEROL 1.25 MG/1
1 CAPSULE ORAL
Qty: 0 | Refills: 0 | DISCHARGE

## 2019-09-19 RX ORDER — SIMETHICONE 80 MG/1
80 TABLET, CHEWABLE ORAL ONCE
Refills: 0 | Status: COMPLETED | OUTPATIENT
Start: 2019-09-19 | End: 2019-09-19

## 2019-09-19 RX ORDER — PANTOPRAZOLE SODIUM 20 MG/1
40 TABLET, DELAYED RELEASE ORAL
Refills: 0 | Status: DISCONTINUED | OUTPATIENT
Start: 2019-09-19 | End: 2019-09-20

## 2019-09-19 RX ORDER — ERGOCALCIFEROL 1.25 MG/1
0 CAPSULE ORAL
Qty: 0 | Refills: 0 | DISCHARGE

## 2019-09-19 RX ADMIN — Medication 600 MILLIGRAM(S): at 21:12

## 2019-09-19 RX ADMIN — ATORVASTATIN CALCIUM 10 MILLIGRAM(S): 80 TABLET, FILM COATED ORAL at 21:12

## 2019-09-19 RX ADMIN — AMLODIPINE BESYLATE 10 MILLIGRAM(S): 2.5 TABLET ORAL at 05:05

## 2019-09-19 RX ADMIN — LISINOPRIL 40 MILLIGRAM(S): 2.5 TABLET ORAL at 05:05

## 2019-09-19 RX ADMIN — Medication 1 PATCH: at 17:08

## 2019-09-19 RX ADMIN — Medication 10 MILLIGRAM(S): at 17:13

## 2019-09-19 RX ADMIN — SIMETHICONE 80 MILLIGRAM(S): 80 TABLET, CHEWABLE ORAL at 05:05

## 2019-09-19 RX ADMIN — Medication 1 PATCH: at 19:30

## 2019-09-19 RX ADMIN — Medication 600 MILLIGRAM(S): at 05:05

## 2019-09-19 RX ADMIN — Medication 1 PATCH: at 08:05

## 2019-09-19 RX ADMIN — Medication 1334 MILLIGRAM(S): at 09:10

## 2019-09-19 RX ADMIN — PANTOPRAZOLE SODIUM 40 MILLIGRAM(S): 20 TABLET, DELAYED RELEASE ORAL at 05:05

## 2019-09-19 RX ADMIN — Medication 1334 MILLIGRAM(S): at 21:12

## 2019-09-19 NOTE — H&P CARDIOLOGY - HISTORY OF PRESENT ILLNESS
57 y/o AA female (denies implanted cardiac devices), current cigarette smoker x 20PY with PMHx of HTN, HLD, and CKD V on HD (M/W/F) via Left AV fistula and Anemia of chronic disease who presented to the ED @ [facility] on 9/16 c/o chest pain, nausea and exertional dyspnea x 3 days.  Patient states that the chest pain was intermittent in nature and began after HD on 9/13 and was accompanied nausea which persisted to date of admission.  Patient states that 2/2 nausea she was unable to tolerate her antihypertensives and did not take them for 3 days.  Patient also states that the dyspnea progressed over the course of the 3 days  and on the day of admission she was only able to walk a few steps before stopping to rest "because I could not breathe".  At OSH EKG without acute changes, CXR showing Cardiomegaly/CHF, ProBNP [id number], Troponin I .051-->.052-->.045.  Patients BP on presentation to the ED was 225/120.  She was evaluated by Cardiology and admitted to telemetry with Hypertensive urgency and NSTEMI.  TTE on 9/17 showing Normal LV Systolic function EF 55-60%, moderate concentric LVH, grade 1 diastolic dysfunction, mild MR/AS.  Patient is now transferred to [facility] for further cardiac evaluation including cardiac catheterization.

## 2019-09-19 NOTE — PATIENT PROFILE ADULT - CHOOSE INDICATION TO IMMUNIZE (AN ORDER WILL BE GENERATED WHEN THIS NOTE IS SAVED):
[facility], Emergency Department    [address]    [place] [address]    Phone:  [phone]    Fax:  [phone]                                       Gadiel James   MRN: [id number]    Department:  [facility], Emergency Department   Date of Visit:  8/19/2018           Patient Information     Date Of Birth          2003        Your diagnoses for this visit were:     Autism        You were seen by Morgan Sierra MD.        Discharge Instructions       Follow up with your established providers     Consider in home therapy or individual therapy.  [facility] will call to help assist this if needed    24 Hour Appointment Hotline       To make an appointment at any [facility], call [phone]). If you don't have a family doctor or clinic, we will help you find one. [facility] clinics are conveniently located to serve the needs of you and your family.             Review of your medicines      Our records show that you are taking the medicines listed below. If these are incorrect, please call your family doctor or clinic.        Dose / Directions Last dose taken    ABILIFY 15 MG tablet   Dose:  7.5 mg   Generic drug:  ARIPiprazole        Take 7.5 mg by mouth daily   Refills:  0        CHILDRENS MULTI VITAMINS/IRON PO        Refills:  0        FLUoxetine 20 MG capsule   Commonly known as:  PROzac   Dose:  20 mg   Quantity:  30 capsule        Take 1 capsule (20 mg) by mouth daily   Refills:  1        guanFACINE 2 MG Tb24 24 hr tablet   Commonly known as:  INTUNIV   Dose:  2 mg   Quantity:  30 tablet        Take 1 tablet (2 mg) by mouth At Bedtime   Refills:  3        lisdexamfetamine 40 MG capsule   Commonly known as:  VYVANSE   Dose:  40 mg   Quantity:  30 capsule        Take 1 capsule (40 mg) by mouth every morning   Refills:  0        study - metFORMIN  MG 24 hr tablet   Commonly known as:  ids 4862   Dose:  500 mg        Take 500 mg by mouth 2 times daily (with  meals) Prescribed by Dr. Miller   Refills:  0                Orders Needing Specimen Collection     Ordered          08/19/18 1817  Drug abuse screen 6 urine (chem dep) - STAT, [facility]: STAT, Needs to be Collected     Scheduled Task Status   08/19/18 1818 Collect Drug abuse screen 6 urine (chem dep) Open   Order Class:  PCU Collect                  Pending Results     No orders found from 8/17/2018 to 8/20/2018.            Pending Culture Results     No orders found from 8/17/2018 to 8/20/2018.            Pending Results Instructions     If you had any lab results that were not finalized at the time of your Discharge, you can call the ED Lab Result RN at [phone]. You will be contacted by this team for any positive Lab results or changes in treatment. The nurses are available 7 days a week from 10A to 6:30P.  You can leave a message 24 hours per day and they will return your call.        Thank you for choosing [facility]       Thank you for choosing [facility] for your care. Our goal is always to provide you with excellent care. Hearing back from our patients is one way we can continue to improve our services. Please take a few minutes to complete the written survey that you may receive in the mail after you visit with us. Thank you!        [employer] Information     [employer] lets you send messages to your doctor, view your test results, renew your prescriptions, schedule appointments and more. To sign up, go to [email or web address] contact your [facility] clinic or call [phone] during business hours.            Care EveryWhere ID     This is your Care EveryWhere ID. This could be used by other organizations to access your [facility] medical records  [id number]        Equal Access to Services     [facility] HospitalGIDEON : Zahra Alarcon, waannmarieda lurodri, qaybta kaaltoro richardson. So [facility] [phone].    ATENCIÓN: Si habla español, tiene a fong disposición servicios  laura de asistencia lingüística. Christ warren [phone].    We comply with applicable federal civil rights laws and [place] laws. We do not discriminate on the basis of race, color, national origin, age, disability, sex, sexual orientation, or gender identity.            After Visit Summary       This is your record. Keep this with you and show to your community pharmacist(s) and doctor(s) at your next visit.                   Patient is not pregnant (male or female)

## 2019-09-20 ENCOUNTER — TRANSCRIPTION ENCOUNTER (OUTPATIENT)
Age: 59
End: 2019-09-20

## 2019-09-20 VITALS
DIASTOLIC BLOOD PRESSURE: 85 MMHG | RESPIRATION RATE: 18 BRPM | OXYGEN SATURATION: 97 % | TEMPERATURE: 98 F | SYSTOLIC BLOOD PRESSURE: 176 MMHG | HEART RATE: 63 BPM

## 2019-09-20 DIAGNOSIS — N18.6 END STAGE RENAL DISEASE: ICD-10-CM

## 2019-09-20 DIAGNOSIS — N18.9 CHRONIC KIDNEY DISEASE, UNSPECIFIED: ICD-10-CM

## 2019-09-20 RX ADMIN — Medication 1 PATCH: at 13:56

## 2019-09-20 RX ADMIN — Medication 81 MILLIGRAM(S): at 13:57

## 2019-09-20 RX ADMIN — Medication 600 MILLIGRAM(S): at 13:57

## 2019-09-20 RX ADMIN — AMLODIPINE BESYLATE 10 MILLIGRAM(S): 2.5 TABLET ORAL at 13:58

## 2019-09-20 RX ADMIN — SIMETHICONE 80 MILLIGRAM(S): 80 TABLET, CHEWABLE ORAL at 13:57

## 2019-09-20 RX ADMIN — PANTOPRAZOLE SODIUM 40 MILLIGRAM(S): 20 TABLET, DELAYED RELEASE ORAL at 05:44

## 2019-09-20 RX ADMIN — Medication 1334 MILLIGRAM(S): at 13:58

## 2019-09-20 RX ADMIN — Medication 1 PATCH: at 08:55

## 2019-09-20 RX ADMIN — LISINOPRIL 40 MILLIGRAM(S): 2.5 TABLET ORAL at 13:58

## 2019-09-20 NOTE — CONSULT NOTE ADULT - SUBJECTIVE AND OBJECTIVE BOX
[facility] Consult Note Nephrology - CONSULTATION NOTE    57 y/o AA female (denies implanted cardiac devices), current cigarette smoker x 20PY with PMHx of HTN, HLD, and CKD V on HD (M/W/F) via Left AV fistula and Anemia of chronic disease who presented to the ED @ [facility] on 9/16 c/o chest pain, nausea and exertional dyspnea x 3 days.  Patient states that the chest pain was intermittent in nature and began after HD on 9/13 and was accompanied nausea which persisted to date of admission.  Patient states that 2/2 nausea she was unable to tolerate her antihypertensives and did not take them for 3 days.  Patient also states that the dyspnea progressed over the course of the 3 days  and on the day of admission she was only able to walk a few steps before stopping to rest "because I could not breathe".  At OSH EKG without acute changes, CXR showing Cardiomegaly/CHF, ProBNP 316423, Troponin I .051-->.052-->.045.  Patients BP on presentation to the ED was 225/120.  She was evaluated by Cardiology and admitted to telemetry with Hypertensive urgency and NSTEMI.  TTE on 9/17 showing Normal LV Systolic function EF 55-60%, moderate concentric LVH, grade 1 diastolic dysfunction, mild MR/AS.  Patient is now transferred to [facility] for further cardiac evaluation including cardiac catheterization.   Renal consult for esrd on hd- cardiac cath , non obstructive disease with no intervention. now awaiting hd  denies any f/c/n/v/d/c/sob/cp    PAST MEDICAL & SURGICAL HISTORY:  Anemia of chronic disease  Hyperlipidemia, unspecified hyperlipidemia type  CKD (chronic kidney disease) stage V requiring chronic dialysis  GERD (gastroesophageal reflux disease)  HTN (hypertension)  AV fistula  Fibroid, uterine: s/p ablation  History of bilateral breast reduction surgery    No Known Allergies    Home Medications Reviewed  Hospital Medications:   MEDICATIONS  (STANDING):  amLODIPine   Tablet 10 milliGRAM(s) Oral daily  aspirin enteric coated 81 milliGRAM(s) Oral daily  atorvastatin 10 milliGRAM(s) Oral at bedtime  calcium acetate 1334 milliGRAM(s) Oral four times a day with meals  labetalol 600 milliGRAM(s) Oral three times a day  lisinopril 40 milliGRAM(s) Oral daily  nicotine - 21 mG/24Hr(s) Patch 1 patch Transdermal daily  pantoprazole    Tablet 40 milliGRAM(s) Oral before breakfast    SOCIAL HISTORY:  Denies ETOh,Smoking,   FAMILY HISTORY:  Family history of acute myocardial infarction (Father)  Family history of diabetes mellitus (DM) (Father, Mother)  Family history of essential hypertension (Father, Mother)    REVIEW OF SYSTEMS:  CONSTITUTIONAL: No weakness, fevers or chills  EYES/ENT: No visual changes;  No vertigo or throat pain   NECK: No pain or stiffness  RESPIRATORY: No cough, wheezing, hemoptysis; No shortness of breath  CARDIOVASCULAR: No chest pain or palpitations.  GASTROINTESTINAL: No abdominal or epigastric pain. No nausea, vomiting, or hematemesis; No diarrhea or constipation. No melena or hematochezia.  GENITOURINARY: No dysuria, frequency, foamy urine, urinary urgency, incontinence or hematuria  NEUROLOGICAL: No numbness or weakness  SKIN: No itching, burning, rashes, or lesions   VASCULAR: No bilateral lower extremity edema.   All other review of systems is negative unless indicated above.    VITALS:  T(F): 98.3 (09-20-19 @ 08:40), Max: 98.5 (09-19-19 @ 20:08)  HR: 70 (09-20-19 @ 08:40)  BP: 154/88 (09-20-19 @ 08:40)  RR: 17 (09-20-19 @ 08:40)  SpO2: 100% (09-20-19 @ 08:40)  Wt(kg): --    Height (cm): 157.5 (09-19 @ 16:25)  Weight (kg): 60.5 (09-19 @ 16:25)  BMI (kg/m2): 24.4 (09-19 @ 16:25)  BSA (m2): 1.61 (09-19 @ 16:25)  PHYSICAL EXAM:  Constitutional: NAD  HEENT: anicteric sclera, oropharynx clear, MMM  Neck: No JVD  Respiratory: CTAB, no wheezes, rales or rhonchi  Cardiovascular: S1, S2, RRR  Gastrointestinal: BS+, soft, NT/ND  Extremities: No cyanosis or clubbing. No peripheral edema  Neurological: A/O x 3, no focal deficits  Psychiatric: Normal mood, normal affect  : No CVA tenderness. No burnett.   Skin: No rashes    LABS:  09-19    143  |  103  |  28<H>  ----------------------------<  79  4.4   |  30  |  6.23<H>    Ca    9.6      19 Sep 2019 07:11  Phos  4.3     09-19  Mg     2.0     09-19      Creatinine Trend: 6.23 <--, 10.50 <--, 5.68 <--, 10.10 <--                        10.3   5.03  )-----------( 180      ( 19 Sep 2019 07:11 )             31.2     Urine Studies:      RADIOLOGY & ADDITIONAL STUDIES:

## 2019-09-20 NOTE — DISCHARGE NOTE NURSING/CASE MANAGEMENT/SOCIAL WORK - NSDCPEWEB_GEN_ALL_CORE
[facility] for Tobacco Control website --- [email or web address] website --- [email or web address]

## 2019-09-20 NOTE — DISCHARGE NOTE PROVIDER - HOSPITAL COURSE
59 y/o AA female (denies implanted cardiac devices), current cigarette smoker x 20PY with PMHx of HTN, HLD, and CKD V on HD (M/W/F) via Left AV fistula and Anemia of chronic disease who presented to the ED @ [facility] on 9/16 c/o chest pain, nausea and exertional dyspnea x 3 days.  Patient states that the chest pain was intermittent in nature and began after HD on 9/13 and was accompanied nausea which persisted to date of admission.  Patient states that 2/2 nausea she was unable to tolerate her antihypertensives and did not take them for 3 days.  Patient also states that the dyspnea progressed over the course of the 3 days  and on the day of admission she was only able to walk a few steps before stopping to rest "because I could not breathe".  At OSH EKG without acute changes, CXR showing Cardiomegaly/CHF, ProBNP 810628, Troponin I .051-->.052-->.045.  Patients BP on presentation to the ED was 225/120.  She was evaluated by Cardiology and admitted to telemetry with Hypertensive urgency and NSTEMI.  TTE on 9/17 showing Normal LV Systolic function EF 55-60%, moderate concentric LVH, grade 1 diastolic dysfunction, mild MR/AS.  Patient is now transferred to [facility] for further cardiac evaluation including cardiac catheterization. s/p LHC with medical management. Hypertensive urgency resolved. per Renal non obstructive disease with no intervention - for HD. s/p HD and     stable for discharge. 57 y/o AA female (denies implanted cardiac devices), current cigarette smoker x 20PY with PMHx of HTN, HLD, and CKD V on HD (M/W/F) via Left AV fistula and Anemia of chronic disease who presented to the ED @ [facility] on 9/16 c/o chest pain, nausea and exertional dyspnea x 3 days.  Patient states that the chest pain was intermittent in nature and began after HD on 9/13 and was accompanied nausea which persisted to date of admission.  Patient states that 2/2 nausea she was unable to tolerate her antihypertensives and did not take them for 3 days.  Patient also states that the dyspnea progressed over the course of the 3 days  and on the day of admission she was only able to walk a few steps before stopping to rest "because I could not breathe".  At OSH EKG without acute changes, CXR showing Cardiomegaly/CHF, ProBNP 415346, Troponin I .051-->.052-->.045.  Patients BP on presentation to the ED was 225/120.  She was evaluated by Cardiology and admitted to telemetry with Hypertensive urgency and NSTEMI.  TTE on 9/17 showing Normal LV Systolic function EF 55-60%, moderate concentric LVH, grade 1 diastolic dysfunction, mild MR/AS.  Patient is now transferred to [facility] for further cardiac evaluation including cardiac catheterization. s/p LHC with medical management. Hypertensive urgency resolved. per Renal non obstructive disease with no intervention - for HD. s/p HD and stable for discharge.

## 2019-09-20 NOTE — DISCHARGE NOTE PROVIDER - CARE PROVIDER_API CALL
Demetrio Alonso)  Cardiovascular Disease  [address]  [place] [address]  Phone: [phone]  Fax: [phone]  Follow Up Time:

## 2019-09-20 NOTE — DISCHARGE NOTE NURSING/CASE MANAGEMENT/SOCIAL WORK - PATIENT PORTAL LINK FT
You can access the FollowMyHealth Patient Portal offered by [facility] by registering at the following website: [email or web address]. By joining [employer]’s FollowMyHealth portal, you will also be able to view your health information using other applications (apps) compatible with our system.

## 2019-09-20 NOTE — CONSULT NOTE ADULT - ASSESSMENT
59 y/o AA female (denies implanted cardiac devices), current cigarette smoker x 20PY with PMHx of HTN, HLD, and CKD V on HD (M/W/F) via Left AV fistula s/p cardiac cath with no intervention

## 2019-09-20 NOTE — DISCHARGE NOTE PROVIDER - NSDCCPCAREPLAN_GEN_ALL_CORE_FT
PRINCIPAL DISCHARGE DIAGNOSIS  Diagnosis: Chest pain  Assessment and Plan of Treatment:       SECONDARY DISCHARGE DIAGNOSES  Diagnosis: ESRD (end stage renal disease)  Assessment and Plan of Treatment: ESRD (end stage renal disease)    Diagnosis: Hypertension  Assessment and Plan of Treatment: PRINCIPAL DISCHARGE DIAGNOSIS  Diagnosis: Chest pain  Assessment and Plan of Treatment: 1. You were admitted for chest pain of which resolved. You had a cardiac catherization of which the intervention is to treat with medication management.   2. Please follow up with Cardiology in 1 week for continued evaluation  3. Please return to the ED with any worsening symptoms  4. Follow up with your primary care doctor in 1 week  5. Continue all home meds as prescribed.         SECONDARY DISCHARGE DIAGNOSES  Diagnosis: ESRD (end stage renal disease)  Assessment and Plan of Treatment: 1. Please continue with your hemodialysis as instructed by your nephrologist.  2. Follow up with you nephrologist in 1 week  3. Continue your renal diet       Diagnosis: Hypertension  Assessment and Plan of Treatment: 1. Your blood pressure was elevated on admission. Of which normalized at this time.   2. Continue taking all meds as prescribed  3. Follow up with your primary care doctor in 1 week  4. Low sodium diet   5. Return to ED with any worsening symptoms

## 2019-09-23 DIAGNOSIS — K21.9 GASTRO-ESOPHAGEAL REFLUX DISEASE WITHOUT ESOPHAGITIS: ICD-10-CM

## 2019-09-23 DIAGNOSIS — Z99.2 DEPENDENCE ON RENAL DIALYSIS: ICD-10-CM

## 2019-09-23 DIAGNOSIS — N18.6 END STAGE RENAL DISEASE: ICD-10-CM

## 2019-09-23 DIAGNOSIS — I12.0 HYPERTENSIVE CHRONIC KIDNEY DISEASE WITH STAGE 5 CHRONIC KIDNEY DISEASE OR END STAGE RENAL DISEASE: ICD-10-CM

## 2019-09-23 DIAGNOSIS — D63.1 ANEMIA IN CHRONIC KIDNEY DISEASE: ICD-10-CM

## 2019-09-23 DIAGNOSIS — I21.4 NON-ST ELEVATION (NSTEMI) MYOCARDIAL INFARCTION: ICD-10-CM

## 2019-09-23 DIAGNOSIS — R07.9 CHEST PAIN, UNSPECIFIED: ICD-10-CM

## 2019-10-01 PROCEDURE — C1887: CPT

## 2019-10-01 PROCEDURE — 93454 CORONARY ARTERY ANGIO S&I: CPT

## 2019-10-01 PROCEDURE — C1894: CPT

## 2019-10-01 PROCEDURE — C1769: CPT

## 2019-10-01 PROCEDURE — 99152 MOD SED SAME PHYS/QHP 5/>YRS: CPT

## 2019-12-21 NOTE — PATIENT PROFILE ADULT - NSPROPASSIVESMOKEEXPOSURE_GEN_A_NUR
Devon Pan)  Cardiovascular Disease; Internal Medicine  Cardiology [facility], [address] [place] [address]  Phone: [phone]  Fax: [phone]  Follow Up Time:     Cosme Krishnamurthy)  Cardiovascular Disease; Internal Medicine  [address] [place] [address]  Phone: [phone]  Fax: [phone]  Follow Up Time: No

## 2020-06-09 NOTE — PATIENT PROFILE ADULT - NSASFUNCLEVELADLTOILET_GEN_A_NUR
[address] of [address] Comfort Verbal Consent   I conducted a telehealth visit with Angelita Preston today, 06/09/20, which was completed using two-way, real-time interactive audio injection. He had one session of physical therapy with Kamala Aly at the [facility], but then stopped due to the COVID-19 pandemic. In the meantime his back is been getting worse.   He complains of chronic bilateral lower back pain in the bandlike patte TABLET BY MOUTH EVERY DAY AS NEEDED FOR PAIN 30 tablet 0   • Varenicline Tartrate (CHANTIX STARTING [place]) 0.5 MG X 11 & 1 MG X 42 Oral Misc Take as directed (Patient not taking: Reported on 6/9/2020 ) 1 Package 0         ALLERGIES:   No Known Allergie claudication  Recommend physical therapy for neutral to flexion lumbar stabilization.   We will pursue this at the [facility].  Since he is functionally having difficulty ambulating within the household I also recommend that we do bilate 0 = independent

## 2020-09-30 NOTE — ED ADULT NURSE NOTE - NS ED NURSE RECORD ANOTHER VITAL SIGN
Upper GI Endoscopy: Before Your Procedure  What is an upper GI endoscopy? An upper gastrointestinal (or GI) endoscopy is a test that allows your doctor to look at the inside of your esophagus, stomach, and the first part of your small intestine, called the duodenum. The esophagus is the tube that carries food to your stomach. The doctor uses a thin, lighted tube that bends. It is called an endoscope, or scope. The doctor puts the tip of the scope in your mouth and gently moves it down your throat. The scope is a flexible video camera. The doctor looks at a monitor (like a TV set or a computer screen) as he or she moves the scope. A doctor may do this procedure to look for ulcers, tumors, infection, or bleeding. It also can be used to look for signs of acid backing up into your esophagus. This is called gastroesophageal reflux disease, or GERD. The doctor can use the scope to take a sample of tissue for study (a biopsy). The doctor also can use the scope to take out growths or stop bleeding. Follow-up care is a key part of your treatment and safety. Be sure to make and go to all appointments, and call your doctor if you are having problems. It's also a good idea to know your test results and keep a list of the medicines you take. How do you prepare for the procedure? Procedures can be stressful. This information will help you understand what you can expect. And it will help you safely prepare for your procedure. Preparing for the procedure    · Do not eat or drink anything for 6 to 8 hours before the test. An empty stomach helps your doctor see your stomach clearly during the test. It also reduces your chances of vomiting. If you vomit, there is a small risk that the vomit could enter your lungs.  (This is called aspiration.) If the test is done in an emergency, a tube may be inserted through your nose or mouth to empty your stomach.     · Do not take sucralfate (Carafate) or antacids on the day of the test. These medicines can make it hard for your doctor to see your upper GI tract.     · If your doctor tells you to, stop taking iron supplements 7 to 14 days before the test.     · Be sure you have someone to take you home. Anesthesia and pain medicine will make it unsafe for you to drive or get home on your own.     · Understand exactly what procedure is planned, along with the risks, benefits, and other options. · Tell your doctor ALL the medicines, vitamins, supplements, and herbal remedies you take. Some may increase the risk of problems during your procedure. Your doctor will tell you if you should stop taking any of them before the procedure and how soon to do it.     · If you take aspirin or some other blood thinner, ask your doctor if you should stop taking it before your procedure. Make sure that you understand exactly what your doctor wants you to do. These medicines increase the risk of bleeding.     · Make sure your doctor and the hospital have a copy of your advance directive. If you don't have one, you may want to prepare one. It lets others know your health care wishes. It's a good thing to have before any type of surgery or procedure. What happens on the day of the procedure? · Follow the instructions exactly about when to stop eating and drinking. If you don't, your procedure may be canceled. If your doctor told you to take your medicines on the day of the procedure, take them with only a sip of water.     · Take a bath or shower before you come in for your procedure. Do not apply lotions, perfumes, deodorants, or nail polish.     · Take off all jewelry and piercings. And take out contact lenses, if you wear them. At the hospital or surgery center   · Bring a picture ID.     · The test may take 15 to 30 minutes.     · The doctor may spray medicine on the back of your throat to numb it. You also will get medicine to prevent pain and to relax you.     · You will lie on your left side.  The doctor will put the scope in your mouth and toward the back of your throat. The doctor will tell you when to swallow. This helps the scope move down your throat. You will be able to breathe normally. The doctor will move the scope down your esophagus into your stomach. The doctor also may look at the duodenum.     · If your doctor wants to take a sample of tissue for a biopsy, he or she may use small surgical tools, which are put into the scope, to cut off some tissue. You will not feel a biopsy, if one is taken. The doctor also can use the tools to stop bleeding or to do other treatments, if needed.     · You will stay at the hospital or surgery center for 1 to 2 hours until the medicine you were given wears off. What happens after an upper GI endoscopy? · After the test, you may belch and feel bloated for a while.     · You may have a tickling, dry throat or mouth. You may feel a bit hoarse, and you may have a mild sore throat. These symptoms may last several days. Throat lozenges and warm saltwater gargles can help relieve the throat symptoms.     · Don't drive or operate machinery for 12 hours after the test.     · Your doctor will tell you when you can go back to your usual diet and activities.     · Don't drink alcohol for 12 to 24 hours after the test.   When should you call your doctor? · You have questions or concerns.     · You don't understand how to prepare for your procedure.     · You become ill before the procedure (such as fever, flu, or a cold).     · You need to reschedule or have changed your mind about having the procedure. Where can you learn more? Go to http://silverio-donato.info/  Enter P790 in the search box to learn more about \"Upper GI Endoscopy: Before Your Procedure. \"  Current as of: April 15, 2020               Content Version: 12.6  © 3196-1650 [employer].    Care instructions adapted under license by [employer] (which disclaims liability or warranty for this information). If you have questions about a medical condition or this instruction, always ask your healthcare professional. [place] [address] any warranty or liability for your use of this information. Yes

## 2021-10-04 NOTE — H&P ADULT. - GENITOURINARY FEMALE
Comprehensive Nutrition Assessment    Type and Reason for Visit: Reassess    Nutrition Recommendations/Plan:   Determine nutrition plan of care    Nutrition Assessment:     Chart reviewed; medically noted for COVID-19, pneumonia, respiratory failure, and dementia. Patient remained NPO over the weekend (NPO over a week now). NGT and TF orders noted but NGT was unable to be placed; RD discontinued TF orders. Noted mention of possible transition to hospice. Will monitor progress and plan of care. Estimated Daily Nutrient Needs:  Energy (kcal): 1474 kcal ( x 1.3AF +250kcal); Weight Used for Energy Requirements: Current  Protein (g): 56-67g (1.0-1.2 g/kg bw); Weight Used for Protein Requirements: Current  Fluid (ml/day): 1500 mL; Method Used for Fluid Requirements: 1 ml/kcal    Nutrition Related Findings:       [phone]  Atorvastatin, Famotidine, Humalog, Solu-medrol, Lopressor, D5% IVF + KCl    Wounds:    None       Current Nutrition Therapies:  DIET NPO    Anthropometric Measures:  · Height:  5' (152.4 cm)  · Current Body Wt:  55.8 kg (123 lb 0.3 oz)    · BMI Category:  Normal weight (BMI 18.5-24. 9)       Nutrition Diagnosis:   · Inadequate protein-energy intake related to cognitive or neurological impairment, swallowing difficulty as evidenced by NPO or clear liquid status due to medical condition    Nutrition Interventions:   Food and/or Nutrient Delivery:  (comfort measures/feeds?)  Nutrition Education and Counseling: No recommendations at this time  Coordination of Nutrition Care: No recommendation at this time    Goals:  Nutrition support vs transition to comfort measures next 2-4 days       Nutrition Monitoring and Evaluation:   Behavioral-Environmental Outcomes: None identified  Food/Nutrient Intake Outcomes: Diet advancement/tolerance  Physical Signs/Symptoms Outcomes: Biochemical data, Chewing or swallowing, Weight, Fluid status or edema, Hemodynamic status    Discharge Planning: Too soon to determine     Electronically signed by Lencho Burt RD on 10/4/2021 at 11:35 AM    Contact: ext [phone] normal external genitalia/no discharge

## 2023-05-05 NOTE — PROGRESS NOTE ADULT - ATTENDING COMMENTS
likely d/c home in negative if cardiac clearance
Yes
Stress test negative. chest pain free. no evidence of ACS.  Continue current rx, can d/c telemetry and heparin.  d/c planning as per medicine.  Will follow only as needed.

## 2023-07-02 ENCOUNTER — NON-APPOINTMENT (OUTPATIENT)
Age: 63
End: 2023-07-02

## 2023-07-31 NOTE — H&P ADULT - NSHPPHYSICALEXAM_GEN_ALL_CORE
None
ICU Vital Signs Last 24 Hrs  T(C): 36.4 (16 Sep 2019 15:37), Max: 36.9 (16 Sep 2019 11:44)  T(F): 97.6 (16 Sep 2019 15:37), Max: 98.5 (16 Sep 2019 11:44)  HR: 82 (16 Sep 2019 15:37) (82 - 85)  BP: 232/123 (16 Sep 2019 15:37) (225/120 - 232/123)  BP(mean): --  ABP: --  ABP(mean): --  RR: 22 (16 Sep 2019 15:37) (20 - 22)  SpO2: 92% (16 Sep 2019 15:37) (92% - 99%)  GENERAL: NAD well-developed  HEAD:  Atraumatic, Normocephalic  EYES: EOMI, PERRLA, conjunctiva and sclera clear  ENMT: No tonsillar erythema, exudates, or enlargement; Moist mucous membranes, Good dentition, No lesions  NECK: Supple, No JVD, Normal thyroid  NERVOUS SYSTEM:  Alert & Oriented X3, Good concentration; Motor Strength 5/5 B/L upper and lower extremities; DTRs 2+ intact and symmetric  CHEST/LUNG: Clear to percussion bilaterally; No rales, rhonchi, wheezing, or rubs  HEART: Regular rate and rhythm; No murmurs, rubs, or gallops  ABDOMEN: Soft, Nontender, Nondistended; Bowel sounds present  EXTREMITIES:  2+ Peripheral Pulses, No clubbing, cyanosis, or edema  LYMPH: No lymphadenopathy   SKIN: No rashes or lesions

## 2023-12-20 ENCOUNTER — NON-APPOINTMENT (OUTPATIENT)
Age: 63
End: 2023-12-20

## 2024-05-30 ENCOUNTER — EMERGENCY (EMERGENCY)
Facility: HOSPITAL | Age: 64
LOS: 0 days | Discharge: ANOTHER HOSPITAL | End: 2024-05-30
Attending: STUDENT IN AN ORGANIZED HEALTH CARE EDUCATION/TRAINING PROGRAM
Payer: MEDICARE

## 2024-05-30 VITALS
HEART RATE: 102 BPM | HEIGHT: 63 IN | SYSTOLIC BLOOD PRESSURE: 89 MMHG | OXYGEN SATURATION: 98 % | WEIGHT: 110.01 LBS | RESPIRATION RATE: 18 BRPM | DIASTOLIC BLOOD PRESSURE: 64 MMHG

## 2024-05-30 VITALS
HEART RATE: 82 BPM | DIASTOLIC BLOOD PRESSURE: 58 MMHG | OXYGEN SATURATION: 99 % | RESPIRATION RATE: 27 BRPM | SYSTOLIC BLOOD PRESSURE: 98 MMHG

## 2024-05-30 DIAGNOSIS — I95.9 HYPOTENSION, UNSPECIFIED: ICD-10-CM

## 2024-05-30 DIAGNOSIS — D25.9 LEIOMYOMA OF UTERUS, UNSPECIFIED: Chronic | ICD-10-CM

## 2024-05-30 DIAGNOSIS — I12.0 HYPERTENSIVE CHRONIC KIDNEY DISEASE WITH STAGE 5 CHRONIC KIDNEY DISEASE OR END STAGE RENAL DISEASE: ICD-10-CM

## 2024-05-30 DIAGNOSIS — N18.6 END STAGE RENAL DISEASE: ICD-10-CM

## 2024-05-30 DIAGNOSIS — R00.0 TACHYCARDIA, UNSPECIFIED: ICD-10-CM

## 2024-05-30 DIAGNOSIS — I77.0 ARTERIOVENOUS FISTULA, ACQUIRED: Chronic | ICD-10-CM

## 2024-05-30 DIAGNOSIS — R10.9 UNSPECIFIED ABDOMINAL PAIN: ICD-10-CM

## 2024-05-30 DIAGNOSIS — Z99.2 DEPENDENCE ON RENAL DIALYSIS: ICD-10-CM

## 2024-05-30 DIAGNOSIS — E78.5 HYPERLIPIDEMIA, UNSPECIFIED: ICD-10-CM

## 2024-05-30 DIAGNOSIS — Z90.13 ACQUIRED ABSENCE OF BILATERAL BREASTS AND NIPPLES: Chronic | ICD-10-CM

## 2024-05-30 DIAGNOSIS — D63.8 ANEMIA IN OTHER CHRONIC DISEASES CLASSIFIED ELSEWHERE: ICD-10-CM

## 2024-05-30 LAB
ALBUMIN SERPL ELPH-MCNC: 2.4 G/DL — LOW (ref 3.3–5)
ALP SERPL-CCNC: 330 U/L — HIGH (ref 40–120)
ALT FLD-CCNC: 13 U/L — SIGNIFICANT CHANGE UP (ref 12–78)
ANION GAP SERPL CALC-SCNC: 18 MMOL/L — HIGH (ref 5–17)
ANION GAP SERPL CALC-SCNC: 20 MMOL/L — HIGH (ref 5–17)
APTT BLD: 27.7 SEC — SIGNIFICANT CHANGE UP (ref 24.5–35.6)
AST SERPL-CCNC: 102 U/L — HIGH (ref 15–37)
BASE EXCESS BLDV CALC-SCNC: 2.8 MMOL/L — SIGNIFICANT CHANGE UP (ref -2–3)
BASE EXCESS BLDV CALC-SCNC: 5.7 MMOL/L — HIGH (ref -2–3)
BASOPHILS # BLD AUTO: 0 K/UL — SIGNIFICANT CHANGE UP (ref 0–0.2)
BASOPHILS # BLD AUTO: 0.04 K/UL — SIGNIFICANT CHANGE UP (ref 0–0.2)
BASOPHILS NFR BLD AUTO: 0 % — SIGNIFICANT CHANGE UP (ref 0–2)
BASOPHILS NFR BLD AUTO: 0.2 % — SIGNIFICANT CHANGE UP (ref 0–2)
BILIRUB SERPL-MCNC: 2.2 MG/DL — HIGH (ref 0.2–1.2)
BLD GP AB SCN SERPL QL: SIGNIFICANT CHANGE UP
BLOOD GAS COMMENTS, VENOUS: SIGNIFICANT CHANGE UP
BLOOD GAS COMMENTS, VENOUS: SIGNIFICANT CHANGE UP
BUN SERPL-MCNC: 81 MG/DL — HIGH (ref 7–23)
BUN SERPL-MCNC: 81 MG/DL — HIGH (ref 7–23)
CALCIUM SERPL-MCNC: 10.8 MG/DL — HIGH (ref 8.5–10.1)
CALCIUM SERPL-MCNC: 11.4 MG/DL — HIGH (ref 8.5–10.1)
CHLORIDE BLDV-SCNC: 91 MMOL/L — LOW (ref 98–107)
CHLORIDE BLDV-SCNC: 91 MMOL/L — LOW (ref 98–107)
CHLORIDE SERPL-SCNC: 90 MMOL/L — LOW (ref 96–108)
CHLORIDE SERPL-SCNC: 91 MMOL/L — LOW (ref 96–108)
CO2 BLDV-SCNC: 31 MMOL/L — HIGH (ref 22–26)
CO2 BLDV-SCNC: 34 MMOL/L — HIGH (ref 22–26)
CO2 SERPL-SCNC: 27 MMOL/L — SIGNIFICANT CHANGE UP (ref 22–31)
CO2 SERPL-SCNC: 27 MMOL/L — SIGNIFICANT CHANGE UP (ref 22–31)
CREAT SERPL-MCNC: 7.44 MG/DL — HIGH (ref 0.5–1.3)
CREAT SERPL-MCNC: 7.51 MG/DL — HIGH (ref 0.5–1.3)
EGFR: 6 ML/MIN/1.73M2 — LOW
EGFR: 6 ML/MIN/1.73M2 — LOW
EOSINOPHIL # BLD AUTO: 0 K/UL — SIGNIFICANT CHANGE UP (ref 0–0.5)
EOSINOPHIL # BLD AUTO: 0.04 K/UL — SIGNIFICANT CHANGE UP (ref 0–0.5)
EOSINOPHIL NFR BLD AUTO: 0 % — SIGNIFICANT CHANGE UP (ref 0–6)
EOSINOPHIL NFR BLD AUTO: 0.2 % — SIGNIFICANT CHANGE UP (ref 0–6)
FLUAV AG NPH QL: SIGNIFICANT CHANGE UP
FLUBV AG NPH QL: SIGNIFICANT CHANGE UP
GAS PNL BLDV: 133 MMOL/L — LOW (ref 136–145)
GAS PNL BLDV: 135 MMOL/L — LOW (ref 136–145)
GAS PNL BLDV: SIGNIFICANT CHANGE UP
GLUCOSE BLDV-MCNC: 128 MG/DL — HIGH (ref 65–95)
GLUCOSE BLDV-MCNC: 59 MG/DL — LOW (ref 65–95)
GLUCOSE SERPL-MCNC: 131 MG/DL — HIGH (ref 70–99)
GLUCOSE SERPL-MCNC: 56 MG/DL — LOW (ref 70–99)
HCO3 BLDV-SCNC: 29 MMOL/L — HIGH (ref 22–28)
HCO3 BLDV-SCNC: 32 MMOL/L — HIGH (ref 22–28)
HCT VFR BLD CALC: 29.2 % — LOW (ref 34.5–45)
HCT VFR BLD CALC: 29.7 % — LOW (ref 34.5–45)
HCT VFR BLD CALC: 32.1 % — LOW (ref 34.5–45)
HCT VFR BLDA CALC: 48 % — HIGH (ref 37–47)
HCT VFR BLDA CALC: SIGNIFICANT CHANGE UP % (ref 37–47)
HGB BLD CALC-MCNC: 16.1 G/DL — SIGNIFICANT CHANGE UP (ref 11.7–16.1)
HGB BLD CALC-MCNC: SIGNIFICANT CHANGE UP G/DL (ref 11.7–16.1)
HGB BLD-MCNC: 10.4 G/DL — LOW (ref 11.5–15.5)
HGB BLD-MCNC: 10.5 G/DL — LOW (ref 11.5–15.5)
HGB BLD-MCNC: 11.3 G/DL — LOW (ref 11.5–15.5)
HOROWITZ INDEX BLDV+IHG-RTO: 21 — SIGNIFICANT CHANGE UP
IMM GRANULOCYTES NFR BLD AUTO: 1.3 % — HIGH (ref 0–0.9)
INR BLD: 1.23 RATIO — HIGH (ref 0.85–1.18)
LACTATE BLDV-MCNC: 7.5 MMOL/L — CRITICAL HIGH (ref 0.56–1.39)
LACTATE BLDV-MCNC: 8.3 MMOL/L — CRITICAL HIGH (ref 0.56–1.39)
LACTATE SERPL-SCNC: 5.4 MMOL/L — CRITICAL HIGH (ref 0.7–2)
LACTATE SERPL-SCNC: 6.5 MMOL/L — CRITICAL HIGH (ref 0.7–2)
LG PLATELETS BLD QL AUTO: SLIGHT — SIGNIFICANT CHANGE UP
LIDOCAIN IGE QN: 26 U/L — SIGNIFICANT CHANGE UP (ref 13–75)
LYMPHOCYTES # BLD AUTO: 0.99 K/UL — LOW (ref 1–3.3)
LYMPHOCYTES # BLD AUTO: 1.31 K/UL — SIGNIFICANT CHANGE UP (ref 1–3.3)
LYMPHOCYTES # BLD AUTO: 4.3 % — LOW (ref 13–44)
LYMPHOCYTES # BLD AUTO: 6 % — LOW (ref 13–44)
MAGNESIUM SERPL-MCNC: 2.9 MG/DL — HIGH (ref 1.6–2.6)
MANUAL SMEAR VERIFICATION: SIGNIFICANT CHANGE UP
MCHC RBC-ENTMCNC: 29 PG — SIGNIFICANT CHANGE UP (ref 27–34)
MCHC RBC-ENTMCNC: 29.1 PG — SIGNIFICANT CHANGE UP (ref 27–34)
MCHC RBC-ENTMCNC: 29.3 PG — SIGNIFICANT CHANGE UP (ref 27–34)
MCHC RBC-ENTMCNC: 35 G/DL — SIGNIFICANT CHANGE UP (ref 32–36)
MCHC RBC-ENTMCNC: 35.2 G/DL — SIGNIFICANT CHANGE UP (ref 32–36)
MCHC RBC-ENTMCNC: 36 G/DL — SIGNIFICANT CHANGE UP (ref 32–36)
MCV RBC AUTO: 81.6 FL — SIGNIFICANT CHANGE UP (ref 80–100)
MCV RBC AUTO: 82.7 FL — SIGNIFICANT CHANGE UP (ref 80–100)
MCV RBC AUTO: 82.7 FL — SIGNIFICANT CHANGE UP (ref 80–100)
MONOCYTES # BLD AUTO: 0.66 K/UL — SIGNIFICANT CHANGE UP (ref 0–0.9)
MONOCYTES # BLD AUTO: 1.49 K/UL — HIGH (ref 0–0.9)
MONOCYTES NFR BLD AUTO: 3 % — SIGNIFICANT CHANGE UP (ref 2–14)
MONOCYTES NFR BLD AUTO: 6.4 % — SIGNIFICANT CHANGE UP (ref 2–14)
NEUTROPHILS # BLD AUTO: 19.72 K/UL — HIGH (ref 1.8–7.4)
NEUTROPHILS # BLD AUTO: 20.31 K/UL — HIGH (ref 1.8–7.4)
NEUTROPHILS NFR BLD AUTO: 87.6 % — HIGH (ref 43–77)
NEUTROPHILS NFR BLD AUTO: 90 % — HIGH (ref 43–77)
NRBC # BLD: 0 /100 WBCS — SIGNIFICANT CHANGE UP (ref 0–0)
NRBC # BLD: 0 /100 WBCS — SIGNIFICANT CHANGE UP (ref 0–0)
NRBC # BLD: SIGNIFICANT CHANGE UP /100 WBCS (ref 0–0)
NT-PROBNP SERPL-SCNC: HIGH PG/ML (ref 0–125)
PCO2 BLDV: 52 MMHG — SIGNIFICANT CHANGE UP (ref 42–55)
PCO2 BLDV: 52 MMHG — SIGNIFICANT CHANGE UP (ref 42–55)
PH BLDV: 7.36 — SIGNIFICANT CHANGE UP (ref 7.32–7.43)
PH BLDV: 7.4 — SIGNIFICANT CHANGE UP (ref 7.32–7.43)
PLAT MORPH BLD: NORMAL — SIGNIFICANT CHANGE UP
PLATELET # BLD AUTO: 251 K/UL — SIGNIFICANT CHANGE UP (ref 150–400)
PLATELET # BLD AUTO: 261 K/UL — SIGNIFICANT CHANGE UP (ref 150–400)
PLATELET # BLD AUTO: 294 K/UL — SIGNIFICANT CHANGE UP (ref 150–400)
PO2 BLDV: 20 MMHG — LOW (ref 25–45)
PO2 BLDV: 22 MMHG — LOW (ref 25–45)
POLYCHROMASIA BLD QL SMEAR: SLIGHT — SIGNIFICANT CHANGE UP
POTASSIUM BLDV-SCNC: 4.8 MMOL/L — SIGNIFICANT CHANGE UP (ref 3.5–5.1)
POTASSIUM BLDV-SCNC: 6 MMOL/L — HIGH (ref 3.5–5.1)
POTASSIUM SERPL-MCNC: 4.4 MMOL/L — SIGNIFICANT CHANGE UP (ref 3.5–5.3)
POTASSIUM SERPL-MCNC: 5.8 MMOL/L — HIGH (ref 3.5–5.3)
POTASSIUM SERPL-SCNC: 4.4 MMOL/L — SIGNIFICANT CHANGE UP (ref 3.5–5.3)
POTASSIUM SERPL-SCNC: 5.8 MMOL/L — HIGH (ref 3.5–5.3)
PROT SERPL-MCNC: 7.4 GM/DL — SIGNIFICANT CHANGE UP (ref 6–8.3)
PROTHROM AB SERPL-ACNC: 14.5 SEC — HIGH (ref 9.5–13)
RBC # BLD: 3.58 M/UL — LOW (ref 3.8–5.2)
RBC # BLD: 3.59 M/UL — LOW (ref 3.8–5.2)
RBC # BLD: 3.88 M/UL — SIGNIFICANT CHANGE UP (ref 3.8–5.2)
RBC # FLD: 16.9 % — HIGH (ref 10.3–14.5)
RBC # FLD: 17 % — HIGH (ref 10.3–14.5)
RBC # FLD: 17 % — HIGH (ref 10.3–14.5)
RBC BLD AUTO: NORMAL — SIGNIFICANT CHANGE UP
SAO2 % BLDV: 16.9 % — LOW (ref 94–98)
SAO2 % BLDV: SIGNIFICANT CHANGE UP % (ref 94–98)
SARS-COV-2 RNA SPEC QL NAA+PROBE: SIGNIFICANT CHANGE UP
SODIUM SERPL-SCNC: 136 MMOL/L — SIGNIFICANT CHANGE UP (ref 135–145)
SODIUM SERPL-SCNC: 137 MMOL/L — SIGNIFICANT CHANGE UP (ref 135–145)
TARGETS BLD QL SMEAR: SLIGHT — SIGNIFICANT CHANGE UP
TROPONIN I, HIGH SENSITIVITY RESULT: 125.4 NG/L — HIGH
TROPONIN I, HIGH SENSITIVITY RESULT: 130.5 NG/L — HIGH
VARIANT LYMPHS # BLD: 1 % — SIGNIFICANT CHANGE UP (ref 0–6)
WBC # BLD: 21.91 K/UL — HIGH (ref 3.8–10.5)
WBC # BLD: 23.16 K/UL — HIGH (ref 3.8–10.5)
WBC # BLD: 23.85 K/UL — HIGH (ref 3.8–10.5)
WBC # FLD AUTO: 21.91 K/UL — HIGH (ref 3.8–10.5)
WBC # FLD AUTO: 23.16 K/UL — HIGH (ref 3.8–10.5)
WBC # FLD AUTO: 23.85 K/UL — HIGH (ref 3.8–10.5)

## 2024-05-30 PROCEDURE — 93010 ELECTROCARDIOGRAM REPORT: CPT

## 2024-05-30 PROCEDURE — 71275 CT ANGIOGRAPHY CHEST: CPT | Mod: 26,MC

## 2024-05-30 PROCEDURE — 71045 X-RAY EXAM CHEST 1 VIEW: CPT | Mod: 26

## 2024-05-30 PROCEDURE — 99291 CRITICAL CARE FIRST HOUR: CPT

## 2024-05-30 PROCEDURE — 74174 CTA ABD&PLVS W/CONTRAST: CPT | Mod: 26,MC

## 2024-05-30 PROCEDURE — 72125 CT NECK SPINE W/O DYE: CPT | Mod: 26,MC

## 2024-05-30 PROCEDURE — 70450 CT HEAD/BRAIN W/O DYE: CPT | Mod: 26,MC

## 2024-05-30 RX ORDER — DEXTROSE 50 % IN WATER 50 %
50 SYRINGE (ML) INTRAVENOUS ONCE
Refills: 0 | Status: COMPLETED | OUTPATIENT
Start: 2024-05-30 | End: 2024-05-30

## 2024-05-30 RX ORDER — HYDROMORPHONE HYDROCHLORIDE 2 MG/ML
0.2 INJECTION INTRAMUSCULAR; INTRAVENOUS; SUBCUTANEOUS ONCE
Refills: 0 | Status: DISCONTINUED | OUTPATIENT
Start: 2024-05-30 | End: 2024-05-30

## 2024-05-30 RX ORDER — SODIUM CHLORIDE 9 MG/ML
250 INJECTION INTRAMUSCULAR; INTRAVENOUS; SUBCUTANEOUS ONCE
Refills: 0 | Status: COMPLETED | OUTPATIENT
Start: 2024-05-30 | End: 2024-05-30

## 2024-05-30 RX ORDER — SODIUM CHLORIDE 9 MG/ML
500 INJECTION INTRAMUSCULAR; INTRAVENOUS; SUBCUTANEOUS ONCE
Refills: 0 | Status: COMPLETED | OUTPATIENT
Start: 2024-05-30 | End: 2024-05-30

## 2024-05-30 RX ORDER — PANTOPRAZOLE SODIUM 20 MG/1
40 TABLET, DELAYED RELEASE ORAL ONCE
Refills: 0 | Status: COMPLETED | OUTPATIENT
Start: 2024-05-30 | End: 2024-05-30

## 2024-05-30 RX ORDER — HYDROCORTISONE 20 MG
100 TABLET ORAL ONCE
Refills: 0 | Status: COMPLETED | OUTPATIENT
Start: 2024-05-30 | End: 2024-05-30

## 2024-05-30 RX ORDER — ONDANSETRON 8 MG/1
4 TABLET, FILM COATED ORAL ONCE
Refills: 0 | Status: COMPLETED | OUTPATIENT
Start: 2024-05-30 | End: 2024-05-30

## 2024-05-30 RX ORDER — VANCOMYCIN HCL 1 G
1000 VIAL (EA) INTRAVENOUS ONCE
Refills: 0 | Status: COMPLETED | OUTPATIENT
Start: 2024-05-30 | End: 2024-05-30

## 2024-05-30 RX ORDER — INSULIN HUMAN 100 [IU]/ML
5 INJECTION, SOLUTION SUBCUTANEOUS ONCE
Refills: 0 | Status: COMPLETED | OUTPATIENT
Start: 2024-05-30 | End: 2024-05-30

## 2024-05-30 RX ORDER — ACETAMINOPHEN 500 MG
750 TABLET ORAL ONCE
Refills: 0 | Status: COMPLETED | OUTPATIENT
Start: 2024-05-30 | End: 2024-05-30

## 2024-05-30 RX ORDER — NOREPINEPHRINE BITARTRATE/D5W 8 MG/250ML
0.05 PLASTIC BAG, INJECTION (ML) INTRAVENOUS
Qty: 8 | Refills: 0 | Status: DISCONTINUED | OUTPATIENT
Start: 2024-05-30 | End: 2024-05-30

## 2024-05-30 RX ORDER — PIPERACILLIN AND TAZOBACTAM 4; .5 G/20ML; G/20ML
3.38 INJECTION, POWDER, LYOPHILIZED, FOR SOLUTION INTRAVENOUS ONCE
Refills: 0 | Status: COMPLETED | OUTPATIENT
Start: 2024-05-30 | End: 2024-05-30

## 2024-05-30 RX ADMIN — Medication 750 MILLIGRAM(S): at 19:43

## 2024-05-30 RX ADMIN — Medication 4.68 MICROGRAM(S)/KG/MIN: at 22:17

## 2024-05-30 RX ADMIN — INSULIN HUMAN 5 UNIT(S): 100 INJECTION, SOLUTION SUBCUTANEOUS at 18:46

## 2024-05-30 RX ADMIN — Medication 50 MILLILITER(S): at 17:45

## 2024-05-30 RX ADMIN — SODIUM CHLORIDE 500 MILLILITER(S): 9 INJECTION INTRAMUSCULAR; INTRAVENOUS; SUBCUTANEOUS at 20:30

## 2024-05-30 RX ADMIN — PIPERACILLIN AND TAZOBACTAM 3.38 GRAM(S): 4; .5 INJECTION, POWDER, LYOPHILIZED, FOR SOLUTION INTRAVENOUS at 19:43

## 2024-05-30 RX ADMIN — ONDANSETRON 4 MILLIGRAM(S): 8 TABLET, FILM COATED ORAL at 20:27

## 2024-05-30 RX ADMIN — PANTOPRAZOLE SODIUM 40 MILLIGRAM(S): 20 TABLET, DELAYED RELEASE ORAL at 20:36

## 2024-05-30 RX ADMIN — Medication 100 MILLIGRAM(S): at 22:12

## 2024-05-30 RX ADMIN — PIPERACILLIN AND TAZOBACTAM 200 GRAM(S): 4; .5 INJECTION, POWDER, LYOPHILIZED, FOR SOLUTION INTRAVENOUS at 18:59

## 2024-05-30 RX ADMIN — HYDROMORPHONE HYDROCHLORIDE 0.2 MILLIGRAM(S): 2 INJECTION INTRAMUSCULAR; INTRAVENOUS; SUBCUTANEOUS at 20:26

## 2024-05-30 RX ADMIN — Medication 300 MILLIGRAM(S): at 18:06

## 2024-05-30 RX ADMIN — Medication 1000 MILLIGRAM(S): at 22:09

## 2024-05-30 RX ADMIN — Medication 250 MILLIGRAM(S): at 19:59

## 2024-05-30 RX ADMIN — SODIUM CHLORIDE 250 MILLILITER(S): 9 INJECTION INTRAMUSCULAR; INTRAVENOUS; SUBCUTANEOUS at 17:54

## 2024-05-30 RX ADMIN — SODIUM CHLORIDE 250 MILLILITER(S): 9 INJECTION INTRAMUSCULAR; INTRAVENOUS; SUBCUTANEOUS at 19:47

## 2024-05-30 RX ADMIN — SODIUM CHLORIDE 250 MILLILITER(S): 9 INJECTION INTRAMUSCULAR; INTRAVENOUS; SUBCUTANEOUS at 20:39

## 2024-05-30 RX ADMIN — SODIUM CHLORIDE 250 MILLILITER(S): 9 INJECTION INTRAMUSCULAR; INTRAVENOUS; SUBCUTANEOUS at 19:43

## 2024-05-30 RX ADMIN — Medication 50 MILLILITER(S): at 18:46

## 2024-05-30 NOTE — ED ADULT NURSE REASSESSMENT NOTE - NS ED NURSE REASSESS COMMENT FT1
Handoff report received from JONNA Carranza for shift change. Plan of care reviewed. Pt received resting on stretcher. Universal fall precautions in place, side rails x2 raised,  socks provided, personal belongings within reach, oriented to call bell system. Continuous cardiac and SpO2 monitoring in progress. afib rhythm on mnitor. Pt AOx4, lethargic but arousable to voice, speaking in slow clear sentences. IV site & patency inspected and integrity maintained. rt ac 20-G US IV patent, rt hand 22G patent, Zosyn finished. Pt c/o abd pain, states had left kidney removed and has been having abd pain since. Left arm precaution for dialysis, MWF, went yesterday.

## 2024-05-30 NOTE — ED PROVIDER NOTE - CRITICAL CARE ATTENDING CONTRIBUTION TO CARE
This patient presents with evidence of a high probability of an imminent life or limb threatening condition requiring rapid intervention to prevent deterioration in the patient’s condition.  High complexity decision making to assess, manipulate, and support this patient is documented below.  It is my clinical judgement that failure to initiate these interventions on an urgent basis would likely result in sudden, clinically significant or life threatening deterioration in the patient's condition.      The total time spent evaluating, managing, and providing care to a critically ill patient was: [60] minutes.  This includes direct patient care at the bedside as well as time spent reviewing test results, discussing the case with consultants or family members, and documenting in the patient's chart.   It was exclusive of separately billable procedures and any teaching time related to this case.

## 2024-05-30 NOTE — ED PROVIDER NOTE - PROGRESS NOTE DETAILS
Herron DO: prelim imaging without acute dissection, calcification noted in the lower aorta - left RP changes possible post surgical, unclear if abscess but no signs of bleeding by blood work, sepsis w/u initiated due to leukocytosis though afebrile, gentle fluids > CT read expedited Herron DO: repeat bp stable, pt still c/o abd pain, points to right mid abd area, low dose analgesia ordered, bedside pocus no pericardial effusion, IVC appears variable but difficult view -pt likely to tolerate additional fluids, will continue to administer Herron DO: tachycardia improved, MAP 65, pt states abd pain improved after 0.2mg dilaudid, pt on 2-3L NC for support but no hypoxia, pt feels well, will provide hydrocortisone as stress dose steroid - will have levophed ready for transport, d/w [facility] transfer rep Gurdeep , and d/w surgical ICU Dr Miranda,  and d/w pts surgeon Dr Han for transfer and accepted, discussed with Dr Miranda possible mesenteric ischemia given results and lactate result, stable H/H, possible sepsis  transfer ctr updated. pt consented

## 2024-05-30 NOTE — ED ADULT NURSE NOTE - NSFALLRISKINTERV_ED_ALL_ED

## 2024-05-30 NOTE — ED PROVIDER NOTE - PHYSICAL EXAMINATION
Gen: aox3, nad,   Head: NCAT  ENT: Airway patent, moist mucous membranes, nasal passageways clear,   Cardiac: Normal rate, normal rhythm   Respiratory: Lungs CTA B/L  Gastrointestinal: Abdomen soft, mild LLQ pain , nondistended, no rebound, no guarding  MSK: No gross abnormalities, FROM of all four extremities, no edema, + thrill LUE AVF  HEME: Extremities warm, pulses intact and symmetrical in all four extremities  Skin: No rashes, no lesions  Neuro: No gross neurologic deficits

## 2024-05-30 NOTE — ED PROVIDER NOTE - NSICDXPASTMEDICALHX_GEN_ALL_CORE_FT
PAST MEDICAL HISTORY:  Anemia of chronic disease     CKD (chronic kidney disease) stage V requiring chronic dialysis     GERD (gastroesophageal reflux disease)     HTN (hypertension)     Hyperlipidemia, unspecified hyperlipidemia type

## 2024-05-30 NOTE — ED PROVIDER NOTE - CLINICAL SUMMARY MEDICAL DECISION MAKING FREE TEXT BOX
63F pmhx ESRD dialysis MWF, HTN, HLD, hydromorphone 2mg @ 1145AM, who presents for evaluation of severe left sided abd pain with radiation to left shoulder onset x 1 day, pt reports fall x 2, initial fall was 5 days ago while getting out of bathtub - hit her abdomen and did not hit head. She is not sure about fall today but EMS states she was on her back on the floor. She denies headache. She notes mild left chest discomfort. Denies fever. Notes constipation but no bloody stools. Patient appeared lethargic per EMS. Pt states first dose of pain medication was today. Pt denies bloody stools. Last dialysis was yday and completed session  - labs to assess for anemia, eval for metabolic derangements, check vbg, expedited CT to rule out dissection or RP bleed or signs of complication from L nephrectomy, gentle fluids as pt ESRD and at increased risk of fluid overload, prelim read without acute dissection, empiric abx, rule out obstruction, may require transfer to [facility] pending CT results 63F pmhx ESRD dialysis MWF, HTN, HLD, hydromorphone 2mg @ 1145AM, who presents for evaluation of severe left sided abd pain with radiation to left shoulder onset x 1 day, pt reports fall x 2, initial fall was 5 days ago while getting out of bathtub - hit her abdomen and did not hit head. She is not sure about fall today but EMS states she was on her back on the floor. She denies headache. She notes mild left chest discomfort. Denies fever. Notes constipation but no bloody stools. Patient appeared lethargic per EMS. Pt states first dose of pain medication was today. Pt denies bloody stools. Last dialysis was yday and completed session  - labs to assess for anemia, eval for metabolic derangements, check vbg, expedited CT to rule out dissection or RP bleed or signs of complication from L nephrectomy, gentle fluids as pt ESRD and at increased risk of fluid overload, prelim read without acute dissection, empiric abx, rule out obstruction, may require transfer to [facility] pending CT results- d/w pts sister who reports pts surgeon was Dr Jakob Han  , surgery May 24.

## 2024-05-30 NOTE — PHARMACOTHERAPY INTERVENTION NOTE - COMMENTS
Patient here with chief complaint of hypotension and dizziness with severe left sided abdominal pain radiating to left shoulder since last night. Pharmacy at bedside with ED team to assess patient. Per EMS, patient fell prior to arrival and was found on her back. Patient endorses gas pain and constipation for several days. On arrival blood glucose 55 mg/dL, given one amp of D50W.

## 2024-05-30 NOTE — ED PROVIDER NOTE - OBJECTIVE STATEMENT
63F pmhx ESRD dialysis MWF, HTN, HLD, hydromorphone 2mg @ 1145AM, who presents for evaluation of severe left sided abd pain with radiation to left shoulder onset x 1 day, pt reports fall x 2, initial fall was 5 days ago while getting out of bathtub - hit her abdomen and did not hit head. She is not sure about fall today but EMS states she was on her back on the floor. She denies headache. She notes mild left chest discomfort. Denies fever. Notes constipation but no bloody stools. Patient appeared lethargic per EMS. Pt states first dose of pain medication was today. Pt denies bloody stools. Last dialysis was yday and completed session

## 2024-05-30 NOTE — ED ADULT TRIAGE NOTE - CHIEF COMPLAINT QUOTE
Patient BIB EMS from home for SOB, Hypotension, tachypneic and dizziness, as per EMS patient took dilaudid. AVF left Arm, history of kidney removal

## 2024-05-30 NOTE — ED PROVIDER NOTE - NSICDXPASTSURGICALHX_GEN_ALL_CORE_FT
PAST SURGICAL HISTORY:  AV fistula     Fibroid, uterine s/p ablation    History of bilateral breast reduction surgery

## 2024-05-30 NOTE — ED ADULT TRIAGE NOTE - WEIGHT IN KG
SUBJECTIVE / OVERNIGHT EVENTS:    s/p highflo  no on 2L NC  patient seen and examined  clear urine    --------------------------------------------------------------------------------------------  LABS:                        7.2    4.74  )-----------( 23       ( 06 Oct 2021 05:54 )             22.2     10-06    147<H>  |  112<H>  |  38<H>  ----------------------------<  92  3.9   |  24  |  1.32<H>    Ca    8.6      06 Oct 2021 05:54  Phos  4.7     10-05  Mg     2.1     10-05    TPro  6.5  /  Alb  3.4  /  TBili  1.8<H>  /  DBili  0.3<H>  /  AST  23  /  ALT  12  /  AlkPhos  119  10-05    PT/INR - ( 05 Oct 2021 15:23 )   PT: 15.4 sec;   INR: 1.30 ratio         PTT - ( 05 Oct 2021 15:23 )  PTT:37.8 sec  CAPILLARY BLOOD GLUCOSE      POCT Blood Glucose.: 86 mg/dL (06 Oct 2021 12:25)  POCT Blood Glucose.: 84 mg/dL (06 Oct 2021 06:36)  POCT Blood Glucose.: 102 mg/dL (06 Oct 2021 00:25)    CARDIAC MARKERS ( 05 Oct 2021 18:14 )  x     / x     / 87 U/L / x     / 2.1 ng/mL      Urinalysis Basic - ( 05 Oct 2021 16:28 )    Color: Light Orange / Appearance: Slightly Turbid / S.021 / pH: x  Gluc: x / Ketone: Negative  / Bili: Negative / Urobili: Negative   Blood: x / Protein: 30 mg/dL / Nitrite: Negative   Leuk Esterase: Moderate / RBC: 1865 /hpf / WBC 28 /HPF   Sq Epi: x / Non Sq Epi: 1 /hpf / Bacteria: Negative        RADIOLOGY & ADDITIONAL TESTS:    Imaging Personally Reviewed:  [x] YES  [ ] NO    Consultant(s) Notes Reviewed:  [x] YES  [ ] NO    MEDICATIONS  (STANDING):  aztreonam  IVPB 1000 milliGRAM(s) IV Intermittent every 8 hours  aztreonam  IVPB      dextrose 40% Gel 15 Gram(s) Oral once  dextrose 5%. 1000 milliLiter(s) (50 mL/Hr) IV Continuous <Continuous>  dextrose 5%. 1000 milliLiter(s) (100 mL/Hr) IV Continuous <Continuous>  dextrose 50% Injectable 25 Gram(s) IV Push once  dextrose 50% Injectable 12.5 Gram(s) IV Push once  dextrose 50% Injectable 25 Gram(s) IV Push once  doxazosin 4 milliGRAM(s) Oral at bedtime  furosemide   Injectable 40 milliGRAM(s) IV Push two times a day  glucagon  Injectable 1 milliGRAM(s) IntraMuscular once  influenza   Vaccine 0.5 milliLiter(s) IntraMuscular once  metoprolol succinate ER 25 milliGRAM(s) Oral daily    MEDICATIONS  (PRN):      Care Discussed with Consultants/Other Providers [x] YES  [ ] NO    Vital Signs Last 24 Hrs  T(C): 36.7 (06 Oct 2021 08:30), Max: 36.8 (06 Oct 2021 00:01)  T(F): 98 (06 Oct 2021 08:30), Max: 98.3 (06 Oct 2021 00:01)  HR: 60 (06 Oct 2021 10:44) (57 - 89)  BP: 136/70 (06 Oct 2021 10:44) (109/61 - 173/95)  BP(mean): 82 (05 Oct 2021 21:43) (73 - 95)  RR: 20 (06 Oct 2021 10:44) (18 - 48)  SpO2: 100% (06 Oct 2021 10:44) (96% - 100%)  I&O's Summary    05 Oct 2021 07:01  -  06 Oct 2021 07:00  --------------------------------------------------------  IN: 0 mL / OUT: 1200 mL / NET: -1200 mL    06 Oct 2021 07:01  -  06 Oct 2021 14:47  --------------------------------------------------------  IN: 555 mL / OUT: 1000 mL / NET: -445 mL    PHYSICAL EXAM:  General: WN/WD NAD  HEENT: PERRLA, EOMI, moist mucous membranes  Neurology: A&Ox3, nonfocal, RAMIREZ x 4  Respiratory: crackles bilaterally, comfortable appearing  CV: RRR, S1S2, no murmurs, rubs or gallops  Abdominal: Soft, NT, ND +BS  Extremities: No edema, + peripheral pulses           49.9

## 2024-05-30 NOTE — ED ADULT NURSE NOTE - OBJECTIVE STATEMENT
63 yr old female AOx4. C/o hypotension. Reports being 63 yr old female AOx4. C/o abdominal pain and left shoulder pain x1 day. Multiple episodes of N/V. PMH of CKD with left AV fistula, HTN, and HLD. Reports multiple falls. Pt denies diarrhea, fever/chills, h/a, dizziness. No neuro deficits. Hypotensive and hypoglycemic in field as per EMS.

## 2024-05-31 PROBLEM — E78.5 HYPERLIPIDEMIA, UNSPECIFIED: Chronic | Status: ACTIVE | Noted: 2019-09-19

## 2024-05-31 PROBLEM — D63.8 ANEMIA IN OTHER CHRONIC DISEASES CLASSIFIED ELSEWHERE: Chronic | Status: ACTIVE | Noted: 2019-09-19

## 2024-05-31 PROBLEM — N18.6 END STAGE RENAL DISEASE: Chronic | Status: ACTIVE | Noted: 2019-09-19

## 2024-05-31 LAB
-  BACTEROIDES FRAGILIS: SIGNIFICANT CHANGE UP
-  K. PNEUMONIAE GROUP: SIGNIFICANT CHANGE UP
GRAM STN FLD: ABNORMAL
GRAM STN FLD: ABNORMAL
METHOD TYPE: SIGNIFICANT CHANGE UP
SPECIMEN SOURCE: SIGNIFICANT CHANGE UP
SPECIMEN SOURCE: SIGNIFICANT CHANGE UP

## 2024-05-31 NOTE — ED POST DISCHARGE NOTE - OTHER
Reviewed patient EMR - pt transferred to [facility] SICU. Called patient's daughter Leticia Guillermo - discussed blood culture results, pt's daughter states that she will inform pt's [facility] SICU Team with regards to results. Reviewed patient EMR - pt received broad spectrum antibiotics in ED, pt transferred to [facility] SICU. Called patient's daughter Leticia Guillermo - discussed blood culture results, pt's daughter states that she will inform pt's [facility] SICU Team with regards to results.

## 2024-06-02 LAB
-  AMPICILLIN/SULBACTAM: SIGNIFICANT CHANGE UP
-  AMPICILLIN/SULBACTAM: SIGNIFICANT CHANGE UP
-  AMPICILLIN: SIGNIFICANT CHANGE UP
-  AMPICILLIN: SIGNIFICANT CHANGE UP
-  AZTREONAM: SIGNIFICANT CHANGE UP
-  AZTREONAM: SIGNIFICANT CHANGE UP
-  CEFAZOLIN: SIGNIFICANT CHANGE UP
-  CEFAZOLIN: SIGNIFICANT CHANGE UP
-  CEFEPIME: SIGNIFICANT CHANGE UP
-  CEFEPIME: SIGNIFICANT CHANGE UP
-  CEFOXITIN: SIGNIFICANT CHANGE UP
-  CEFOXITIN: SIGNIFICANT CHANGE UP
-  CEFTRIAXONE: SIGNIFICANT CHANGE UP
-  CEFTRIAXONE: SIGNIFICANT CHANGE UP
-  CIPROFLOXACIN: SIGNIFICANT CHANGE UP
-  CIPROFLOXACIN: SIGNIFICANT CHANGE UP
-  ERTAPENEM: SIGNIFICANT CHANGE UP
-  ERTAPENEM: SIGNIFICANT CHANGE UP
-  GENTAMICIN: SIGNIFICANT CHANGE UP
-  GENTAMICIN: SIGNIFICANT CHANGE UP
-  IMIPENEM: SIGNIFICANT CHANGE UP
-  IMIPENEM: SIGNIFICANT CHANGE UP
-  LEVOFLOXACIN: SIGNIFICANT CHANGE UP
-  LEVOFLOXACIN: SIGNIFICANT CHANGE UP
-  MEROPENEM: SIGNIFICANT CHANGE UP
-  MEROPENEM: SIGNIFICANT CHANGE UP
-  PIPERACILLIN/TAZOBACTAM: SIGNIFICANT CHANGE UP
-  PIPERACILLIN/TAZOBACTAM: SIGNIFICANT CHANGE UP
-  TOBRAMYCIN: SIGNIFICANT CHANGE UP
-  TOBRAMYCIN: SIGNIFICANT CHANGE UP
-  TRIMETHOPRIM/SULFAMETHOXAZOLE: SIGNIFICANT CHANGE UP
-  TRIMETHOPRIM/SULFAMETHOXAZOLE: SIGNIFICANT CHANGE UP
CULTURE RESULTS: ABNORMAL
CULTURE RESULTS: ABNORMAL
GRAM STN FLD: ABNORMAL
GRAM STN FLD: ABNORMAL
METHOD TYPE: SIGNIFICANT CHANGE UP
METHOD TYPE: SIGNIFICANT CHANGE UP
ORGANISM # SPEC MICROSCOPIC CNT: ABNORMAL
ORGANISM # SPEC MICROSCOPIC CNT: SIGNIFICANT CHANGE UP
ORGANISM # SPEC MICROSCOPIC CNT: SIGNIFICANT CHANGE UP
SPECIMEN SOURCE: SIGNIFICANT CHANGE UP
SPECIMEN SOURCE: SIGNIFICANT CHANGE UP

## 2025-03-21 NOTE — INPATIENT CERTIFICATION FOR MEDICARE PATIENTS - PHYSICIAN CONCUR
Patient's respirations 8-9, awakens easily, but returns to sleep. Dr Esparza aware, states to have the hospitalist see the patient. Dr Infante aware, blood gases drawn, PCA stopped.    I concur with the Admission Order and I certify that services are provided in accordance with Section 42 CFR § 412.3

## 2025-05-01 NOTE — PATIENT PROFILE ADULT - ARE SIGNIFICANT INDICATORS COMPLETE.
Will start taking metamucil daily.  Will cut down Sen[place] to once a day for a week and if having still regular BM every day then will stop after week  Will old oral iron at this time        Yes